# Patient Record
Sex: FEMALE | Race: WHITE | Employment: UNEMPLOYED | URBAN - NONMETROPOLITAN AREA
[De-identification: names, ages, dates, MRNs, and addresses within clinical notes are randomized per-mention and may not be internally consistent; named-entity substitution may affect disease eponyms.]

---

## 2017-01-18 ENCOUNTER — NURSE TRIAGE (OUTPATIENT)
Dept: ADMINISTRATIVE | Age: 22
End: 2017-01-18

## 2017-07-21 ENCOUNTER — HOSPITAL ENCOUNTER (OUTPATIENT)
Dept: MRI IMAGING | Age: 22
Discharge: HOME | End: 2017-07-21
Payer: COMMERCIAL

## 2017-07-21 ENCOUNTER — HOSPITAL ENCOUNTER (OUTPATIENT)
Dept: NUCLEAR MEDICINE | Age: 22
Discharge: HOME | End: 2017-07-21
Payer: COMMERCIAL

## 2017-07-21 DIAGNOSIS — M54.5 LOW BACK PAIN, UNSPECIFIED BACK PAIN LATERALITY, UNSPECIFIED CHRONICITY, WITH SCIATICA PRESENCE UNSPECIFIED: ICD-10-CM

## 2017-07-21 PROCEDURE — A9503 TC99M MEDRONATE: HCPCS | Performed by: ORTHOPAEDIC SURGERY

## 2017-07-21 PROCEDURE — 72148 MRI LUMBAR SPINE W/O DYE: CPT

## 2017-07-21 PROCEDURE — 78320 NM BONE SCAN SPECT: CPT

## 2017-07-21 PROCEDURE — 3430000000 HC RX DIAGNOSTIC RADIOPHARMACEUTICAL: Performed by: ORTHOPAEDIC SURGERY

## 2017-07-21 RX ORDER — TC 99M MEDRONATE 20 MG/10ML
22.7 INJECTION, POWDER, LYOPHILIZED, FOR SOLUTION INTRAVENOUS
Status: COMPLETED | OUTPATIENT
Start: 2017-07-21 | End: 2017-07-21

## 2017-07-21 RX ADMIN — Medication 22.7 MILLICURIE: at 12:05

## 2017-07-24 ENCOUNTER — OFFICE VISIT (OUTPATIENT)
Dept: UROLOGY | Age: 22
End: 2017-07-24
Payer: COMMERCIAL

## 2017-07-24 VITALS
BODY MASS INDEX: 26.47 KG/M2 | HEIGHT: 63 IN | DIASTOLIC BLOOD PRESSURE: 78 MMHG | WEIGHT: 149.4 LBS | SYSTOLIC BLOOD PRESSURE: 118 MMHG

## 2017-07-24 DIAGNOSIS — N39.0 RECURRENT UTI: Primary | ICD-10-CM

## 2017-07-24 LAB
BILIRUBIN, POC: NORMAL
BLOOD URINE, POC: NORMAL
CLARITY, POC: CLEAR
COLOR, POC: YELLOW
GLUCOSE URINE, POC: NORMAL
KETONES, POC: NORMAL
LEUKOCYTE EST, POC: NORMAL
NITRITE, POC: NORMAL
PH, POC: 5.5
POST VOID RESIDUAL (PVR): 84 ML
PROTEIN, POC: NORMAL
SPECIFIC GRAVITY, POC: 1.02
UROBILINOGEN, POC: NORMAL

## 2017-07-24 PROCEDURE — 81003 URINALYSIS AUTO W/O SCOPE: CPT | Performed by: UROLOGY

## 2017-07-24 PROCEDURE — 99203 OFFICE O/P NEW LOW 30 MIN: CPT | Performed by: UROLOGY

## 2017-07-24 PROCEDURE — 51798 US URINE CAPACITY MEASURE: CPT | Performed by: UROLOGY

## 2017-07-24 RX ORDER — CEPHALEXIN 500 MG/1
500 CAPSULE ORAL 4 TIMES DAILY
Qty: 20 CAPSULE | Refills: 0 | Status: ON HOLD | OUTPATIENT
Start: 2017-07-24 | End: 2018-11-05

## 2017-07-24 ASSESSMENT — ENCOUNTER SYMPTOMS
EYE PAIN: 0
CHEST TIGHTNESS: 0
SHORTNESS OF BREATH: 0
FACIAL SWELLING: 0
COLOR CHANGE: 0
BACK PAIN: 1
ABDOMINAL PAIN: 0
EYE REDNESS: 0
NAUSEA: 0

## 2017-07-27 ENCOUNTER — TELEPHONE (OUTPATIENT)
Dept: UROLOGY | Age: 22
End: 2017-07-27

## 2017-08-09 ENCOUNTER — TELEPHONE (OUTPATIENT)
Dept: UROLOGY | Age: 22
End: 2017-08-09

## 2017-09-26 ENCOUNTER — TELEPHONE (OUTPATIENT)
Dept: UROLOGY | Age: 22
End: 2017-09-26

## 2017-09-26 DIAGNOSIS — N39.0 RECURRENT UTI: Primary | ICD-10-CM

## 2017-10-01 LAB
BILIRUBIN URINE: ABNORMAL MG/DL
BLOOD, URINE: ABNORMAL
CLARITY: ABNORMAL
COLOR: YELLOW
GLUCOSE URINE: ABNORMAL
KETONES, URINE: NEGATIVE
LEUKOCYTE ESTERASE, URINE: ABNORMAL
NITRITE, URINE: NEGATIVE
PH UA: 6.5 (ref 4.5–8)
PROTEIN UA: ABNORMAL
SPECIFIC GRAVITY UA: 1.02 (ref 1–1.03)
UROBILINOGEN, URINE: NORMAL

## 2017-10-06 ENCOUNTER — TELEPHONE (OUTPATIENT)
Dept: UROLOGY | Age: 22
End: 2017-10-06

## 2018-10-29 ENCOUNTER — HOSPITAL ENCOUNTER (OUTPATIENT)
Dept: NON INVASIVE DIAGNOSTICS | Age: 23
Discharge: HOME | End: 2018-10-29
Payer: COMMERCIAL

## 2018-10-29 PROCEDURE — 93225 XTRNL ECG REC<48 HRS REC: CPT

## 2018-10-29 PROCEDURE — 93226 XTRNL ECG REC<48 HR SCAN A/R: CPT

## 2018-10-29 NOTE — PROCEDURES
The skin was prepped and a holter monitor was applied. The patient was instructed on the documentation of symptoms and the purpose of the holter as well as the things to avoid while wearing the holter. The patient was instructed to remove and return the holter on Thursday, November 1. The serial number of the holter that was applied is [id number].

## 2018-11-05 ENCOUNTER — ANESTHESIA (OUTPATIENT)
Dept: LABOR AND DELIVERY | Age: 23
DRG: 560 | End: 2018-11-05
Payer: COMMERCIAL

## 2018-11-05 ENCOUNTER — ANESTHESIA EVENT (OUTPATIENT)
Dept: LABOR AND DELIVERY | Age: 23
DRG: 560 | End: 2018-11-05
Payer: COMMERCIAL

## 2018-11-05 ENCOUNTER — HOSPITAL ENCOUNTER (INPATIENT)
Age: 23
LOS: 2 days | Discharge: HOME | DRG: 560 | End: 2018-11-07
Attending: OBSTETRICS & GYNECOLOGY | Admitting: OBSTETRICS & GYNECOLOGY
Payer: COMMERCIAL

## 2018-11-05 LAB
ABO: NORMAL
AMPHETAMINE+METHAMPHETAMINE URINE SCREEN: NEGATIVE
ANTIBODY SCREEN: NORMAL
BARBITURATE QUANTITATIVE URINE: NEGATIVE
BENZODIAZEPINE QUANTITATIVE URINE: NEGATIVE
CANNABINOID QUANTITATIVE URINE: NEGATIVE
COCAINE METABOLITE QUANTITATIVE URINE: NEGATIVE
ERYTHROCYTE [DISTWIDTH] IN BLOOD BY AUTOMATED COUNT: 13.2 % (ref 11.5–14.5)
ERYTHROCYTE [DISTWIDTH] IN BLOOD BY AUTOMATED COUNT: 43.1 FL (ref 35–45)
GLUCOSE BLD-MCNC: 63 MG/DL (ref 70–108)
GLUCOSE BLD-MCNC: 72 MG/DL (ref 70–108)
GLUCOSE BLD-MCNC: 75 MG/DL (ref 70–108)
GLUCOSE BLD-MCNC: 83 MG/DL (ref 70–108)
GLUCOSE BLD-MCNC: 88 MG/DL (ref 70–108)
GLUCOSE BLD-MCNC: 99 MG/DL (ref 70–108)
HCT VFR BLD CALC: 41.4 % (ref 37–47)
HEMOGLOBIN: 14.1 GM/DL (ref 12–16)
MCH RBC QN AUTO: 30.3 PG (ref 26–33)
MCHC RBC AUTO-ENTMCNC: 34.1 GM/DL (ref 32.2–35.5)
MCV RBC AUTO: 89 FL (ref 81–99)
OPIATES, URINE: NEGATIVE
OXYCODONE: NEGATIVE
PHENCYCLIDINE QUANTITATIVE URINE: NEGATIVE
PLATELET # BLD: 245 THOU/MM3 (ref 130–400)
PMV BLD AUTO: 10.3 FL (ref 9.4–12.4)
RBC # BLD: 4.65 MILL/MM3 (ref 4.2–5.4)
RH FACTOR: NORMAL
WBC # BLD: 11.9 THOU/MM3 (ref 4.8–10.8)

## 2018-11-05 PROCEDURE — 4A1H7CZ MONITORING OF PRODUCTS OF CONCEPTION, CARDIAC RATE, VIA NATURAL OR ARTIFICIAL OPENING: ICD-10-PCS | Performed by: OBSTETRICS & GYNECOLOGY

## 2018-11-05 PROCEDURE — 86900 BLOOD TYPING SEROLOGIC ABO: CPT

## 2018-11-05 PROCEDURE — 6360000002 HC RX W HCPCS: Performed by: NURSE ANESTHETIST, CERTIFIED REGISTERED

## 2018-11-05 PROCEDURE — 7200000001 HC VAGINAL DELIVERY

## 2018-11-05 PROCEDURE — 0HQ9XZZ REPAIR PERINEUM SKIN, EXTERNAL APPROACH: ICD-10-PCS | Performed by: OBSTETRICS & GYNECOLOGY

## 2018-11-05 PROCEDURE — 10H07YZ INSERTION OF OTHER DEVICE INTO PRODUCTS OF CONCEPTION, VIA NATURAL OR ARTIFICIAL OPENING: ICD-10-PCS | Performed by: OBSTETRICS & GYNECOLOGY

## 2018-11-05 PROCEDURE — 86901 BLOOD TYPING SEROLOGIC RH(D): CPT

## 2018-11-05 PROCEDURE — 85027 COMPLETE CBC AUTOMATED: CPT

## 2018-11-05 PROCEDURE — 10907ZC DRAINAGE OF AMNIOTIC FLUID, THERAPEUTIC FROM PRODUCTS OF CONCEPTION, VIA NATURAL OR ARTIFICIAL OPENING: ICD-10-PCS | Performed by: OBSTETRICS & GYNECOLOGY

## 2018-11-05 PROCEDURE — 1220000001 HC SEMI PRIVATE L&D R&B

## 2018-11-05 PROCEDURE — 36415 COLL VENOUS BLD VENIPUNCTURE: CPT

## 2018-11-05 PROCEDURE — 80307 DRUG TEST PRSMV CHEM ANLYZR: CPT

## 2018-11-05 PROCEDURE — 2580000003 HC RX 258: Performed by: OBSTETRICS & GYNECOLOGY

## 2018-11-05 PROCEDURE — 10H073Z INSERTION OF MONITORING ELECTRODE INTO PRODUCTS OF CONCEPTION, VIA NATURAL OR ARTIFICIAL OPENING: ICD-10-PCS | Performed by: OBSTETRICS & GYNECOLOGY

## 2018-11-05 PROCEDURE — 86850 RBC ANTIBODY SCREEN: CPT

## 2018-11-05 PROCEDURE — 3700000025 ANESTHESIA EPIDURAL BLOCK: Performed by: ANESTHESIOLOGY

## 2018-11-05 PROCEDURE — 82948 REAGENT STRIP/BLOOD GLUCOSE: CPT

## 2018-11-05 PROCEDURE — 3E033VJ INTRODUCTION OF OTHER HORMONE INTO PERIPHERAL VEIN, PERCUTANEOUS APPROACH: ICD-10-PCS | Performed by: OBSTETRICS & GYNECOLOGY

## 2018-11-05 PROCEDURE — 2709999900 HC NON-CHARGEABLE SUPPLY

## 2018-11-05 PROCEDURE — 2500000003 HC RX 250 WO HCPCS: Performed by: ANESTHESIOLOGY

## 2018-11-05 PROCEDURE — 6360000002 HC RX W HCPCS: Performed by: OBSTETRICS & GYNECOLOGY

## 2018-11-05 PROCEDURE — 4A1HXCZ MONITORING OF PRODUCTS OF CONCEPTION, CARDIAC RATE, EXTERNAL APPROACH: ICD-10-PCS | Performed by: OBSTETRICS & GYNECOLOGY

## 2018-11-05 PROCEDURE — 6360000002 HC RX W HCPCS

## 2018-11-05 PROCEDURE — 86592 SYPHILIS TEST NON-TREP QUAL: CPT

## 2018-11-05 RX ORDER — METHYLERGONOVINE MALEATE 0.2 MG/ML
200 INJECTION INTRAVENOUS PRN
Status: DISCONTINUED | OUTPATIENT
Start: 2018-11-05 | End: 2018-11-06 | Stop reason: HOSPADM

## 2018-11-05 RX ORDER — SODIUM CHLORIDE, SODIUM LACTATE, POTASSIUM CHLORIDE, CALCIUM CHLORIDE 600; 310; 30; 20 MG/100ML; MG/100ML; MG/100ML; MG/100ML
INJECTION, SOLUTION INTRAVENOUS CONTINUOUS
Status: DISCONTINUED | OUTPATIENT
Start: 2018-11-05 | End: 2018-11-06

## 2018-11-05 RX ORDER — DIPHENHYDRAMINE HYDROCHLORIDE 50 MG/ML
25 INJECTION INTRAMUSCULAR; INTRAVENOUS EVERY 4 HOURS PRN
Status: DISCONTINUED | OUTPATIENT
Start: 2018-11-05 | End: 2018-11-06 | Stop reason: HOSPADM

## 2018-11-05 RX ORDER — ONDANSETRON 2 MG/ML
8 INJECTION INTRAMUSCULAR; INTRAVENOUS EVERY 6 HOURS PRN
Status: DISCONTINUED | OUTPATIENT
Start: 2018-11-05 | End: 2018-11-06 | Stop reason: HOSPADM

## 2018-11-05 RX ORDER — ONDANSETRON 2 MG/ML
4 INJECTION INTRAMUSCULAR; INTRAVENOUS EVERY 6 HOURS PRN
Status: DISCONTINUED | OUTPATIENT
Start: 2018-11-05 | End: 2018-11-06 | Stop reason: HOSPADM

## 2018-11-05 RX ORDER — MISOPROSTOL 200 UG/1
1000 TABLET ORAL PRN
Status: DISCONTINUED | OUTPATIENT
Start: 2018-11-05 | End: 2018-11-06 | Stop reason: HOSPADM

## 2018-11-05 RX ORDER — NALBUPHINE HCL 10 MG/ML
5 AMPUL (ML) INJECTION EVERY 4 HOURS PRN
Status: DISCONTINUED | OUTPATIENT
Start: 2018-11-05 | End: 2018-11-06 | Stop reason: HOSPADM

## 2018-11-05 RX ORDER — ROPIVACAINE HYDROCHLORIDE 2 MG/ML
INJECTION, SOLUTION EPIDURAL; INFILTRATION; PERINEURAL
Status: COMPLETED
Start: 2018-11-05 | End: 2018-11-05

## 2018-11-05 RX ORDER — PENICILLIN G 2000000 [IU]/50ML
2 INJECTION, SOLUTION INTRAVENOUS ONCE
Status: COMPLETED | OUTPATIENT
Start: 2018-11-05 | End: 2018-11-05

## 2018-11-05 RX ORDER — ACETAMINOPHEN 325 MG/1
650 TABLET ORAL EVERY 4 HOURS PRN
Status: DISCONTINUED | OUTPATIENT
Start: 2018-11-05 | End: 2018-11-06 | Stop reason: HOSPADM

## 2018-11-05 RX ORDER — NALOXONE HYDROCHLORIDE 0.4 MG/ML
0.4 INJECTION, SOLUTION INTRAMUSCULAR; INTRAVENOUS; SUBCUTANEOUS PRN
Status: DISCONTINUED | OUTPATIENT
Start: 2018-11-05 | End: 2018-11-06 | Stop reason: HOSPADM

## 2018-11-05 RX ORDER — ROPIVACAINE HYDROCHLORIDE 2 MG/ML
INJECTION, SOLUTION EPIDURAL; INFILTRATION; PERINEURAL PRN
Status: DISCONTINUED | OUTPATIENT
Start: 2018-11-05 | End: 2018-11-05 | Stop reason: SDUPTHER

## 2018-11-05 RX ORDER — TERBUTALINE SULFATE 1 MG/ML
0.25 INJECTION, SOLUTION SUBCUTANEOUS ONCE
Status: DISCONTINUED | OUTPATIENT
Start: 2018-11-05 | End: 2018-11-06 | Stop reason: HOSPADM

## 2018-11-05 RX ORDER — BUTORPHANOL TARTRATE 1 MG/ML
1 INJECTION, SOLUTION INTRAMUSCULAR; INTRAVENOUS
Status: DISCONTINUED | OUTPATIENT
Start: 2018-11-05 | End: 2018-11-06 | Stop reason: HOSPADM

## 2018-11-05 RX ORDER — IBUPROFEN 800 MG/1
800 TABLET ORAL EVERY 8 HOURS PRN
Status: CANCELLED | OUTPATIENT
Start: 2018-11-05

## 2018-11-05 RX ORDER — LIDOCAINE HYDROCHLORIDE 10 MG/ML
30 INJECTION, SOLUTION EPIDURAL; INFILTRATION; INTRACAUDAL; PERINEURAL PRN
Status: DISCONTINUED | OUTPATIENT
Start: 2018-11-05 | End: 2018-11-06 | Stop reason: HOSPADM

## 2018-11-05 RX ORDER — CARBOPROST TROMETHAMINE 250 UG/ML
250 INJECTION, SOLUTION INTRAMUSCULAR PRN
Status: DISCONTINUED | OUTPATIENT
Start: 2018-11-05 | End: 2018-11-06 | Stop reason: HOSPADM

## 2018-11-05 RX ORDER — PENICILLIN G 3000000 [IU]/50ML
3 INJECTION, SOLUTION INTRAVENOUS EVERY 4 HOURS
Status: DISCONTINUED | OUTPATIENT
Start: 2018-11-05 | End: 2018-11-06 | Stop reason: HOSPADM

## 2018-11-05 RX ADMIN — ROPIVACAINE HYDROCHLORIDE 10 ML: 2 INJECTION, SOLUTION EPIDURAL; INFILTRATION at 16:17

## 2018-11-05 RX ADMIN — BUTORPHANOL TARTRATE 1 MG: 1 INJECTION, SOLUTION INTRAMUSCULAR; INTRAVENOUS at 14:10

## 2018-11-05 RX ADMIN — PENICILLIN G 2 MILLION UNITS: 2000000 INJECTION, SOLUTION INTRAVENOUS at 12:16

## 2018-11-05 RX ADMIN — SODIUM CHLORIDE, POTASSIUM CHLORIDE, SODIUM LACTATE AND CALCIUM CHLORIDE: 600; 310; 30; 20 INJECTION, SOLUTION INTRAVENOUS at 11:25

## 2018-11-05 RX ADMIN — PENICILLIN G 3 MILLION UNITS: 3000000 INJECTION, SOLUTION INTRAVENOUS at 12:59

## 2018-11-05 RX ADMIN — PENICILLIN G 3 MILLION UNITS: 3000000 INJECTION, SOLUTION INTRAVENOUS at 20:15

## 2018-11-05 RX ADMIN — SODIUM CHLORIDE, POTASSIUM CHLORIDE, SODIUM LACTATE AND CALCIUM CHLORIDE: 600; 310; 30; 20 INJECTION, SOLUTION INTRAVENOUS at 17:00

## 2018-11-05 RX ADMIN — Medication 16 ML/HR: at 16:17

## 2018-11-05 RX ADMIN — ONDANSETRON HYDROCHLORIDE 8 MG: 2 SOLUTION INTRAMUSCULAR; INTRAVENOUS at 18:40

## 2018-11-05 RX ADMIN — PENICILLIN G 3 MILLION UNITS: 3000000 INJECTION, SOLUTION INTRAVENOUS at 16:14

## 2018-11-05 RX ADMIN — SODIUM CHLORIDE, POTASSIUM CHLORIDE, SODIUM LACTATE AND CALCIUM CHLORIDE: 600; 310; 30; 20 INJECTION, SOLUTION INTRAVENOUS at 14:55

## 2018-11-05 RX ADMIN — Medication 1 MILLI-UNITS/MIN: at 12:25

## 2018-11-05 ASSESSMENT — PAIN SCALES - GENERAL: PAINLEVEL_OUTOF10: 5

## 2018-11-05 NOTE — ANESTHESIA PRE PROCEDURE
 carboprost (HEMABATE) injection 250 mcg  250 mcg Intramuscular PRN Jr Singleton MD        misoprostol (CYTOTEC) tablet 1,000 mcg  1,000 mcg Rectal PRN Jr Singleton MD        witch hazel-glycerin (TUCKS) pad   Topical PRN Jr Singleton MD        benzocaine-menthol (DERMOPLAST) 20-0.5 % spray   Topical PRN Jr Singleton MD        penicillin G potassium IVPB 3 Million Units  3 Million Units Intravenous Q4H Jr Singleton  mL/hr at 11/05/18 1614 3 Million Units at 11/05/18 1614    naloxone (NARCAN) injection 0.4 mg  0.4 mg Intravenous PRN Mickey Patel MD        nalbuphine (NUBAIN) injection 5 mg  5 mg Intravenous Q4H PRN Mickey Patel MD        ondansetron [facility]) injection 4 mg  4 mg Intravenous Q6H PRN Mickey Patel MD        fentaNYL 750 mcg, ropivacaine 0.1% in sodium chloride 0.9% 265mL (OB) epidural  16 mL/hr Epidural Continuous Mickey Patel MD 16 mL/hr at 11/05/18 1617 16 mL/hr at 11/05/18 1617     Facility-Administered Medications Ordered in Other Encounters   Medication Dose Route Frequency Provider Last Rate Last Dose    ropivacaine (NAROPIN) 0.2% injection 0.2%    PRN Ana Rhina, APRN - CRNA   10 mL at 11/05/18 1617       Allergies:  No Known Allergies    Problem List:    Patient Active Problem List   Diagnosis Code    Insulin controlled gestational diabetes mellitus in third trimester O24.414    Chronic tonsil/adenoid disease J35.9       Past Medical History:        Diagnosis Date    Gestational diabetes     started on insulin at 16-20 weeks       Past Surgical History:        Procedure Laterality Date    TONSILLECTOMY  2/29/2016       Social History:    Social History   Substance Use Topics    Smoking status: Never Smoker    Smokeless tobacco: Never Used    Alcohol use No                                Counseling given: Not Answered      Vital Signs (Current):   Vitals:    11/05/18 1340 11/05/18 1400 11/05/18 1500 11/05/18 1530   BP:  132/69 111/71 125/68

## 2018-11-05 NOTE — PLAN OF CARE
Problem: Anxiety:  Goal: Level of anxiety will decrease  Level of anxiety will decrease  Outcome: Ongoing  Pt is anxious due to being in early labor, pt is reassured with RN and MD education    Problem: Breathing Pattern - Ineffective:  Goal: Able to breathe comfortably  Able to breathe comfortably  Outcome: Ongoing  RN notes, easy and even respers on assessment. Pt denies being SOB    Problem: Fluid Volume - Imbalance:  Goal: Absence of intrapartum hemorrhage signs and symptoms  Absence of intrapartum hemorrhage signs and symptoms  Outcome: Ongoing  No vaginal bleeding noted at this time. Problem: Labor Process - Prolonged:  Goal: Uterine contractions within specified parameters  Uterine contractions within specified parameters  Outcome: Ongoing  Contractions noted, MD augmented with Pitocin to get them closer and stronger    Problem: Pain - Acute:  Goal: Able to cope with pain  Able to cope with pain  Outcome: Ongoing  Pain /10, pain goal 7/10. Plans a labor epidural    Problem: Tissue Perfusion - Uteroplacental, Altered:  Goal: Absence of abnormal fetal heart rate pattern  Absence of abnormal fetal heart rate pattern  Outcome: Ongoing  Reactive FHT's obtained. Problem: Falls - Risk of:  Goal: Absence of physical injury  Absence of physical injury  Outcome: Ongoing  Call light within reach, pt resting in bed    Problem: Discharge Planning:  Goal: Discharged to appropriate level of care  Discharged to appropriate level of care  Outcome: Ongoing  Pt and  to remain in L&D for recovery period and then will be transferred to [facility] for further care    Comments: Care plan reviewed with patient and FOB. Patient and FOB verbalize understanding of the plan of care and contribute to goal setting.

## 2018-11-06 LAB
GLUCOSE BLD-MCNC: 175 MG/DL (ref 70–108)
HEMOGLOBIN: 12.3 GM/DL (ref 12–16)
RPR: NONREACTIVE

## 2018-11-06 PROCEDURE — 82948 REAGENT STRIP/BLOOD GLUCOSE: CPT

## 2018-11-06 PROCEDURE — 36415 COLL VENOUS BLD VENIPUNCTURE: CPT

## 2018-11-06 PROCEDURE — 2709999900 HC NON-CHARGEABLE SUPPLY

## 2018-11-06 PROCEDURE — 6370000000 HC RX 637 (ALT 250 FOR IP): Performed by: OBSTETRICS & GYNECOLOGY

## 2018-11-06 PROCEDURE — 1220000000 HC SEMI PRIVATE OB R&B

## 2018-11-06 PROCEDURE — 85018 HEMOGLOBIN: CPT

## 2018-11-06 RX ORDER — LANOLIN 100 %
OINTMENT (GRAM) TOPICAL PRN
Status: DISCONTINUED | OUTPATIENT
Start: 2018-11-06 | End: 2018-11-07 | Stop reason: HOSPADM

## 2018-11-06 RX ORDER — HYDROCODONE BITARTRATE AND ACETAMINOPHEN 5; 325 MG/1; MG/1
2 TABLET ORAL EVERY 4 HOURS PRN
Status: DISCONTINUED | OUTPATIENT
Start: 2018-11-06 | End: 2018-11-07 | Stop reason: HOSPADM

## 2018-11-06 RX ORDER — FERROUS SULFATE 325(65) MG
325 TABLET ORAL
Status: DISCONTINUED | OUTPATIENT
Start: 2018-11-06 | End: 2018-11-07 | Stop reason: HOSPADM

## 2018-11-06 RX ORDER — SODIUM CHLORIDE 0.9 % (FLUSH) 0.9 %
10 SYRINGE (ML) INJECTION PRN
Status: DISCONTINUED | OUTPATIENT
Start: 2018-11-06 | End: 2018-11-07 | Stop reason: HOSPADM

## 2018-11-06 RX ORDER — ONDANSETRON 4 MG/1
8 TABLET, FILM COATED ORAL EVERY 8 HOURS PRN
Status: DISCONTINUED | OUTPATIENT
Start: 2018-11-06 | End: 2018-11-07 | Stop reason: HOSPADM

## 2018-11-06 RX ORDER — SODIUM CHLORIDE, SODIUM LACTATE, POTASSIUM CHLORIDE, CALCIUM CHLORIDE 600; 310; 30; 20 MG/100ML; MG/100ML; MG/100ML; MG/100ML
INJECTION, SOLUTION INTRAVENOUS CONTINUOUS
Status: DISCONTINUED | OUTPATIENT
Start: 2018-11-06 | End: 2018-11-07 | Stop reason: HOSPADM

## 2018-11-06 RX ORDER — SODIUM CHLORIDE 0.9 % (FLUSH) 0.9 %
10 SYRINGE (ML) INJECTION EVERY 12 HOURS SCHEDULED
Status: DISCONTINUED | OUTPATIENT
Start: 2018-11-06 | End: 2018-11-06

## 2018-11-06 RX ORDER — HYDROCODONE BITARTRATE AND ACETAMINOPHEN 5; 325 MG/1; MG/1
1 TABLET ORAL EVERY 4 HOURS PRN
Status: DISCONTINUED | OUTPATIENT
Start: 2018-11-06 | End: 2018-11-07 | Stop reason: HOSPADM

## 2018-11-06 RX ORDER — MISOPROSTOL 200 UG/1
1000 TABLET ORAL PRN
Status: DISCONTINUED | OUTPATIENT
Start: 2018-11-06 | End: 2018-11-07 | Stop reason: HOSPADM

## 2018-11-06 RX ORDER — METHYLERGONOVINE MALEATE 0.2 MG/ML
200 INJECTION INTRAVENOUS SEE ADMIN INSTRUCTIONS
Status: DISCONTINUED | OUTPATIENT
Start: 2018-11-06 | End: 2018-11-07 | Stop reason: HOSPADM

## 2018-11-06 RX ORDER — ACETAMINOPHEN 325 MG/1
650 TABLET ORAL EVERY 4 HOURS PRN
Status: DISCONTINUED | OUTPATIENT
Start: 2018-11-06 | End: 2018-11-07 | Stop reason: HOSPADM

## 2018-11-06 RX ORDER — CARBOPROST TROMETHAMINE 250 UG/ML
250 INJECTION, SOLUTION INTRAMUSCULAR
Status: ACTIVE | OUTPATIENT
Start: 2018-11-06 | End: 2018-11-06

## 2018-11-06 RX ORDER — DOCUSATE SODIUM 100 MG/1
100 CAPSULE, LIQUID FILLED ORAL 2 TIMES DAILY PRN
Status: DISCONTINUED | OUTPATIENT
Start: 2018-11-06 | End: 2018-11-07 | Stop reason: HOSPADM

## 2018-11-06 RX ORDER — IBUPROFEN 800 MG/1
800 TABLET ORAL EVERY 8 HOURS
Status: DISCONTINUED | OUTPATIENT
Start: 2018-11-06 | End: 2018-11-07 | Stop reason: HOSPADM

## 2018-11-06 RX ADMIN — DOCUSATE SODIUM 100 MG: 100 CAPSULE, LIQUID FILLED ORAL at 12:55

## 2018-11-06 RX ADMIN — IBUPROFEN 800 MG: 800 TABLET, FILM COATED ORAL at 14:25

## 2018-11-06 RX ADMIN — DOCUSATE SODIUM 100 MG: 100 CAPSULE, LIQUID FILLED ORAL at 20:45

## 2018-11-06 RX ADMIN — IBUPROFEN 800 MG: 800 TABLET, FILM COATED ORAL at 06:18

## 2018-11-06 RX ADMIN — IBUPROFEN 800 MG: 800 TABLET, FILM COATED ORAL at 23:39

## 2018-11-06 ASSESSMENT — PAIN SCALES - GENERAL
PAINLEVEL_OUTOF10: 5
PAINLEVEL_OUTOF10: 5
PAINLEVEL_OUTOF10: 2
PAINLEVEL_OUTOF10: 2
PAINLEVEL_OUTOF10: 6

## 2018-11-06 NOTE — L&D DELIVERY NOTE
Department of Obstetrics and Gynecology  Spontaneous Vaginal Delivery Note      Pt Name: Mary Aguillon  MRN: [id number] [place] #: [de-identified]  YOB: 1995  Procedure Performed By: Dontrell Smith D.O.        Pre-operative Diagnosis:  Term pregnancy, Single fetus and Pregnancy complicated by: [email or web address]    Post-operative Diagnosis: Same, delivered. Procedure:  Spontaneous vaginal delivery or Repair first degree spontaneous laceration    Surgeon:  Shahram Fulton DO, D.O. Information for the patient's :  Manuel Kelly [[id number]]          Anesthesia:  epidural anesthesia    Estimated blood loss:  300ml    Specimen:  Placenta not sent to pathology     Complications:  none    Condition:  infant stable to general nursery and mother stable    Details of Procedure: The patient is a 26 y.o. female at 38w3d   OB History      Para Term  AB Living    2 1 0 1 0 1    SAB TAB Ectopic Molar Multiple Live Births    0 0 0   0 1       who was admitted for early latent labor. She received the following interventions: ARBOW and IV Pitocin augmentation. The patient progressed well,did receive an epidural, became complete and started to push. After pushing for a 1.5hr, suddenly went from 0 station to 3+ in 1 pushing so I was called for delivery as I was on the unit. Dr Deepak Sánchez was notified by staff. Patient progressed well and the fetal head was delivered over an intact perineum, no nuchal cord was noted, and the rest of the infant delivered atraumatically. Infant was placed on mother abdomen. Nose and mouth suctioned with bulb suction. Cord was clamped and cut. The delivery of the placenta was spontaneous and noted intact. The perineum and vagina were explored and a first degree perineal laceration was repaired in standard fashion with 3-0 vicryl. Sponge, instruments, and needle counts were correct. Needles were disposed of appropriately.       Delivery Summary:

## 2018-11-07 VITALS
HEART RATE: 69 BPM | HEIGHT: 61 IN | BODY MASS INDEX: 31.91 KG/M2 | TEMPERATURE: 97.5 F | RESPIRATION RATE: 18 BRPM | DIASTOLIC BLOOD PRESSURE: 69 MMHG | WEIGHT: 169 LBS | SYSTOLIC BLOOD PRESSURE: 118 MMHG | OXYGEN SATURATION: 99 %

## 2018-11-07 PROCEDURE — 2709999900 HC NON-CHARGEABLE SUPPLY

## 2018-11-07 PROCEDURE — 6370000000 HC RX 637 (ALT 250 FOR IP): Performed by: OBSTETRICS & GYNECOLOGY

## 2018-11-07 RX ADMIN — DOCUSATE SODIUM 100 MG: 100 CAPSULE, LIQUID FILLED ORAL at 07:57

## 2018-11-07 RX ADMIN — IBUPROFEN 800 MG: 800 TABLET, FILM COATED ORAL at 11:15

## 2018-11-07 ASSESSMENT — PAIN SCALES - GENERAL: PAINLEVEL_OUTOF10: 5

## 2019-01-05 ENCOUNTER — NURSE TRIAGE (OUTPATIENT)
Dept: ADMINISTRATIVE | Age: 24
End: 2019-01-05

## 2019-05-09 ENCOUNTER — INITIAL CONSULT (OUTPATIENT)
Dept: NEUROLOGY | Age: 24
End: 2019-05-09
Payer: COMMERCIAL

## 2019-05-09 VITALS
BODY MASS INDEX: 27.56 KG/M2 | SYSTOLIC BLOOD PRESSURE: 126 MMHG | HEIGHT: 61 IN | WEIGHT: 146 LBS | HEART RATE: 80 BPM | DIASTOLIC BLOOD PRESSURE: 68 MMHG

## 2019-05-09 DIAGNOSIS — G43.119 INTRACTABLE MIGRAINE WITH AURA WITHOUT STATUS MIGRAINOSUS: Primary | ICD-10-CM

## 2019-05-09 PROCEDURE — G8427 DOCREV CUR MEDS BY ELIG CLIN: HCPCS | Performed by: PSYCHIATRY & NEUROLOGY

## 2019-05-09 PROCEDURE — G8419 CALC BMI OUT NRM PARAM NOF/U: HCPCS | Performed by: PSYCHIATRY & NEUROLOGY

## 2019-05-09 PROCEDURE — 99244 OFF/OP CNSLTJ NEW/EST MOD 40: CPT | Performed by: PSYCHIATRY & NEUROLOGY

## 2019-05-09 RX ORDER — FOLIC ACID 1 MG/1
1 TABLET ORAL DAILY
Qty: 90 TABLET | Refills: 1 | Status: SHIPPED | OUTPATIENT
Start: 2019-05-09

## 2019-05-09 RX ORDER — VENLAFAXINE HYDROCHLORIDE 37.5 MG/1
37.5 CAPSULE, EXTENDED RELEASE ORAL DAILY
Qty: 30 CAPSULE | Refills: 1 | Status: SHIPPED | OUTPATIENT
Start: 2019-05-09

## 2019-05-09 RX ORDER — IBUPROFEN 800 MG/1
800 TABLET ORAL EVERY 6 HOURS PRN
COMMUNITY

## 2019-05-09 NOTE — LETTER
[facility]  [address] [facility] [address]  Dept: [phone]  Dept Fax: [phone]  Loc: [address], APRN - CNP        5/9/2019      Patient:  Brian Eagle  MRN:  [id number]  YOB: 1995  Date of Visit:  5/9/2019    Dear Dr. Aiyana Llanos,    Thank you for referring Ileana Green to me for consultation. Please see attached visit summary with my findings. If you have any questions, please do not hesitate to call me.       Sincerely,         JOO Nettles - CNP

## 2019-05-09 NOTE — PATIENT INSTRUCTIONS
1. Start Effexor 37.5 mg daily   2. Start folic acid 1 mg daily  3. EEG  4. Vitamin B12, folate  5. Reduce the amount of NSAIDs you are taking as these can cause rebound headache  6. Call with any new symptoms or concerns. 7. Follow up in 6 weeks.

## 2019-05-17 ENCOUNTER — HOSPITAL ENCOUNTER (OUTPATIENT)
Dept: NEUROLOGY | Age: 24
Discharge: HOME | End: 2019-05-17
Payer: COMMERCIAL

## 2019-05-17 PROCEDURE — 95819 EEG AWAKE AND ASLEEP: CPT

## 2019-05-17 NOTE — PROGRESS NOTES
[facility] Laboratory Technician worksheet       EEG Date: 2019    Name: Guillermo Escobedo   : 1995   Age: 26 y.o. SEX: female    Room: OP    MRN: [id number]     CSN: [id number]    Ordering Provider: Jonel Mueller  EEG Number: [id number] Time of Test:  10:48    Hand: Right   Sedation: No    H.V. Done: Yes with good effort Photic: Yes    Sleep: No   Drowsy: Yes   Sleep Deprived: No    Seizures observed: no    Technician impression:1    Mentality: alert       Clinical History: Pt states that she is getting headaches almost everyday for the past 3 months. She said that she sees light flashes and black dots in her peripheral vision when this happens. She had a MRI on 3/29/19 . Past Medical History:       Diagnosis Date    Gestational diabetes     started on insulin at 16-20 weeks         Prior to Admission medications    Medication Sig Start Date End Date Taking?  Authorizing Provider   ibuprofen (ADVIL;MOTRIN) 800 MG tablet Take 800 mg by mouth every 6 hours as needed for Pain    Historical Provider, MD   norethindrone-ethinyl estradiol (Rise Chilo) 0.4-35 MG-MCG per tablet Take by mouth    Historical Provider, MD   venlafaxine (EFFEXOR XR) 37.5 MG extended release capsule Take 1 capsule by mouth daily 19   JOO De Santiago CNP   folic acid (FOLVITE) 1 MG tablet Take 1 tablet by mouth daily 19   JOO De Santiago CNP       Technician: Nigel Shine 2019

## 2019-05-20 NOTE — PROGRESS NOTES
Chief Complaint   Patient presents with    Consultation   ? ? Rachid    ? Marisol Szymanski is a 26 y.o. female who presents today for evaluation of headache since teenage years that have increased in frequency and severity in the past 3 months. Symptoms are moderate and constant. Location of pain is behind her bilateral eyes, frontal area, and can radiate to her occipital area. She describes the pain as pressure and rates her pain as 6/10 in severity. She is nauseated and sensitive to light and sound with headache. She can trouble focusing and can see bright floaters in her visual field with headache. Frequency of headache is daily. Typical headache can last 1 day. She has tried over the counter medications for headache without relief. She is taking 1600 mg of ibuprofen daily for headache. No relation of headache to menstruation. Her periods are irregular. She recently went on birth control 5 months ago. She is at risk of pregnancy. She handles stress poorly. She has history of anxiety. Her sleep is poor and interrupted. She wakes up feeling tired. She has been told she snores. MRI brain done 3/29/19 showed unremarkable MRI brain. No family history of headache. No history of head injury with loss of consciousness. She denies chest pain. No shortness of breath, no neck pain. No vision changes. No dysphagia. No fever. No rash. No weight loss. History provided by patient. Past Medical History:   Diagnosis Date    Gestational diabetes ? ? started on insulin at 16-20 weeks   ? Patient Active Problem List   Diagnosis    Insulin controlled gestational diabetes mellitus in third trimester    Chronic tonsil/adenoid disease    Vaginal delivery   ? No Known Allergies  ? Current Outpatient Medications   Medication Sig Dispense Refill    ibuprofen (ADVIL;MOTRIN) 800 MG tablet Take 800 mg by mouth every 6 hours as needed for Pain ?  ?    norethindrone-ethinyl estradiol (BALZIVA) 0.4-35 MG-MCG per tablet Take by appearing, and in no distress, oriented to person, place, and time and over weight  Mental status- Level of Alertness: awake  Orientation: person, place, time  Memory: normal  Fund of Knowledge: normal  Attention/Concentration: normal  Language: normal. Mood is normal.   Neck - supple, no significant adenopathy, carotids upstroke normal bilaterally,   There is no carotid bruit. No neck lymphadenopathy. No thyroid enlargement   Neurological -   Cranial [id number]:.   Cranial nerve II: Normal   Cranial nerve III: Pupils: equal, round, reactive to light  Cranial nerves III, IV, VI: Extraocular Movements: intact   Cranial nerve V: Facial sensation: intact   Cranial nerve VII:Facial strength: intact   Cranial nerve VIII: Hearing: intact   Cranial nerve IX: Palate Elevation intact bilaterally  Cranial nerve XI: Shoulder shrug intact bilaterally  Cranial nerve XII: Tongue midline   neck supple without rigidity, there is no limitation of range of motion of the neck. DTR's are Intact distal and symmetric  Babinski sign negative  Motor exam is 5/5 in the upper and lower extremities. Normal muscle tone . There is no muscle atrophy. Sensory is intact for light touch. Coordination: finger to nose, intact  Gait and station intact  Abnormal movement none, vibration normal, proprioception normal  Skin - normal coloration, no rashes, no suspicious skin lesions  Superficial temporal artery pulses are normal.   There is no limitation of range of motion of the neck. There is no resting tremor, no pin rolling, no bradykinesia, no Hypohonia, normal blink rate. Musculoskeletal: Has no hand arthritis, no limitation of ROM in any of the four extremities. There is no leg edema. The Heart was regular in rate and rhythm. No heart murmur  Chest Clear  Abdomen was soft. ?  We reviewed the patient records from referring provider and available information in the EHR   ?   MRI brain done at [address] [place] [address] 3/29/19: normal ? ASSESSMENT:   ?  ? Diagnosis Orders   1. Intractable migraine with aura without status migrainosus  ? This is a 26year old female who presents with headache for years that has increased in severity and frequency in the past 3 months. She underwent MRI brain on 3/29/19 done at [facility] that showed no concerning findings. Her exam is non focal. She is taking up to 1600mg of ibuprofen a day. She was counseled on reducing her use of over the counter NSAIDs as these can cause rebound headache. We will arrange for her to undergo EEG. .  We also counseled the patient on medications, for headache prevention, we will start her on Effexor as off label use for headache as well as folic acid as she is of child bearing age. After detailed discussion with patient we agreed on the following plan. ? Plan   1. Start Effexor 37.5 mg daily   2. Start folic acid 1 mg daily  3. EEG  4. Vitamin B12, folate  5. Reduce the amount of NSAIDs you are taking as these can cause rebound headache  6. Call with any new symptoms or concerns. 7. Follow up in 6 weeks.      Marianna Reyes MD

## 2019-09-29 ENCOUNTER — NURSE TRIAGE (OUTPATIENT)
Dept: OTHER | Facility: CLINIC | Age: 24
End: 2019-09-29

## 2021-01-18 ENCOUNTER — NURSE TRIAGE (OUTPATIENT)
Dept: OTHER | Facility: CLINIC | Age: 26
End: 2021-01-18

## 2021-01-18 NOTE — TELEPHONE ENCOUNTER
diarrhea, constipation, or urine problems? \"        Legs are shaking, patient is crying in pain    11. PREGNANCY: \"Is there any chance you are pregnant? \" \"When was your last menstrual period? \"        Started her period this morning    Protocols used: ABDOMINAL PAIN - FEMALE-ADULT-[place]

## 2021-10-01 ENCOUNTER — NURSE ONLY (OUTPATIENT)
Dept: LAB | Age: 26
End: 2021-10-01

## 2021-10-06 LAB
C. TRACHOMATIS DNA,THIN PREP: NEGATIVE
N. GONORRHOEAE DNA, THIN PREP: NEGATIVE
SOURCE: NORMAL

## 2021-10-07 LAB
APTIMA MEDIA TYPE: NORMAL
CYTOLOGY THIN PREP PAP: NORMAL
T. VAGINALIS SPECIMEN SOURCE: NORMAL
TRICHOMONAS VAGINALIS BY NAA: NEGATIVE

## 2023-04-03 ENCOUNTER — HOSPITAL ENCOUNTER (OUTPATIENT)
Dept: MRI IMAGING | Age: 28
Discharge: HOME | End: 2023-04-03

## 2023-04-03 DIAGNOSIS — Z00.6 EXAMINATION FOR NORMAL COMPARISON OR CONTROL IN CLINICAL RESEARCH: ICD-10-CM

## 2023-04-28 ENCOUNTER — OFFICE VISIT (OUTPATIENT)
Dept: PHYSICAL MEDICINE AND REHAB | Age: 28
End: 2023-04-28
Payer: COMMERCIAL

## 2023-04-28 VITALS
WEIGHT: 136 LBS | SYSTOLIC BLOOD PRESSURE: 134 MMHG | HEIGHT: 61 IN | BODY MASS INDEX: 25.68 KG/M2 | DIASTOLIC BLOOD PRESSURE: 74 MMHG

## 2023-04-28 DIAGNOSIS — M54.12 CERVICAL RADICULOPATHY: ICD-10-CM

## 2023-04-28 DIAGNOSIS — M79.2 NEUROPATHIC PAIN: ICD-10-CM

## 2023-04-28 DIAGNOSIS — M47.812 CERVICAL SPONDYLOSIS: Primary | ICD-10-CM

## 2023-04-28 PROCEDURE — 99214 OFFICE O/P EST MOD 30 MIN: CPT | Performed by: NURSE PRACTITIONER

## 2023-04-28 PROCEDURE — G8427 DOCREV CUR MEDS BY ELIG CLIN: HCPCS | Performed by: NURSE PRACTITIONER

## 2023-04-28 PROCEDURE — 4004F PT TOBACCO SCREEN RCVD TLK: CPT | Performed by: NURSE PRACTITIONER

## 2023-04-28 PROCEDURE — G8419 CALC BMI OUT NRM PARAM NOF/U: HCPCS | Performed by: NURSE PRACTITIONER

## 2023-04-28 RX ORDER — CEFDINIR 300 MG/1
CAPSULE ORAL
COMMUNITY
Start: 2023-04-26

## 2023-04-28 RX ORDER — MELOXICAM 15 MG/1
15 TABLET ORAL DAILY
COMMUNITY
Start: 2023-03-24

## 2023-04-28 RX ORDER — IBUPROFEN 800 MG/1
800 TABLET ORAL EVERY 8 HOURS PRN
Qty: 90 TABLET | Refills: 0 | Status: SHIPPED | OUTPATIENT
Start: 2023-04-28

## 2023-04-28 NOTE — PROGRESS NOTES
Chronic Pain/PM&R Clinic Note     Encounter Date: 4/28/23    Subjective:   Chief Complaint:   Chief Complaint   Patient presents with    New Patient     Neck pain        History of Present Illness:   Raj Lucas is a 24 y.o. female seen in the clinic initially on 04/28/2023 upon request from Itzel Ceja MD  for her history of neck pain. Patient states back around February of this year she developed severe pain in the right side of her neck and into her right arm. She states she had a visit at [facility] and ended up having an MRI of her cervical spine that showed a large disc herniation at C5-6. She states they discussed potential cervical epidural versus surgery. She like to be conservative at this time. She states since that time her pain is improved significantly. At the time she cannot bend her head forward without severe pain in her neck and arm. She states she must sleep on her back or on her left side due to the pain. She has been trying to do more range of motion exercises with her neck. She states her pain is aggravated when picking up something heavy or when holding anything, such as her 3year-old daughter. She states her neck pain is aggravated when she bends her head backwards or side to side. She describes her pain as throbbing with numbness and tingling down to her fingers. Her pain is improved with rest.  She denies any recent falls or gait/balance disturbances. She does not use assistive ice for ambulation. She has not pursued any physical therapy.     History of Interventions:   Surgery: No previous cervical surgeries  Injections: None    Current Treatment Medications:   Mobic 15 mg Taking PRN    Historical Treatment Medications:   Ibuprofen 600-800 mg PRN - effective  Tylenol - ineffective     Imaging:      Past Medical History:   Diagnosis Date    Gestational diabetes     started on insulin at 16-20 weeks    Migraine 2017       Past Surgical History:   Procedure Laterality Date

## 2023-06-23 ENCOUNTER — OFFICE VISIT (OUTPATIENT)
Dept: PHYSICAL MEDICINE AND REHAB | Age: 28
End: 2023-06-23

## 2023-06-23 VITALS
DIASTOLIC BLOOD PRESSURE: 84 MMHG | HEIGHT: 61 IN | WEIGHT: 136 LBS | BODY MASS INDEX: 25.68 KG/M2 | SYSTOLIC BLOOD PRESSURE: 128 MMHG

## 2023-06-23 DIAGNOSIS — M79.2 NEUROPATHIC PAIN: ICD-10-CM

## 2023-06-23 DIAGNOSIS — M54.12 CERVICAL RADICULOPATHY: Primary | ICD-10-CM

## 2023-06-23 DIAGNOSIS — M47.812 CERVICAL SPONDYLOSIS: ICD-10-CM

## 2023-06-23 DIAGNOSIS — M54.81 BILATERAL OCCIPITAL NEURALGIA: ICD-10-CM

## 2023-06-23 RX ORDER — GABAPENTIN 300 MG/1
300 CAPSULE ORAL 3 TIMES DAILY
Qty: 90 CAPSULE | Refills: 0 | Status: SHIPPED | OUTPATIENT
Start: 2023-06-23 | End: 2023-07-23

## 2023-06-23 NOTE — PROGRESS NOTES
Chronic Pain/PM&R Clinic Note     Encounter Date: 23    Subjective:   Chief Complaint:   Chief Complaint   Patient presents with    Follow-up     Increased headaches       History of Present Illness:   Sarahi Nye is a 24 y.o. female seen in the clinic initially on 23 upon request from No ref. provider found  for her history of *** pain. Current Complaints of Pain:   Location: ***   Radiation: {Anatomy; back pain radiation:55075:a}{Radiation:83870}   Severity: {Symptom severity:28618}***   Character/Quality: Complains of pain that is {type:368942}  Timing: {{Pain timetable:05295}  Associated symptoms: {BACK PAIN ASSOCIATED SYMPTOMS:93964}  Numbness: {yes/no:58770}   Weakness: {yes/no:29045}   Exacerbating factors: {causes; aggravators pain back:34343}  Alleviating factors: {pain alleviation:16082}   Length of time pain has been present: Started on ***, recent exacerbation since ***. Inciting event/injury: {inciting event:03482}   Bowel/Bladder incontinence: {incontinence type:14146}   Falls: {falls:87296}   Sleep: Pain {pain occurrence:62281} interferes with sleep.    Physicial Therapy: {PT Yes/None:98869}   Smoker: {smoker:43864}   {Patient also complains of:59214}   {Patient informed us about their upcomin}     History of Interventions:   Surgery: No previous lumbar/cervical surgeries  Injections: None    Current Treatment Medications:       Historical Treatment Medications:     Imaging:      Past Medical History:   Diagnosis Date    Gestational diabetes     started on insulin at 16-20 weeks    Migraine 2017       Past Surgical History:   Procedure Laterality Date    TONSILLECTOMY  2016    TUBAL LIGATION  2019       Family History   Problem Relation Age of Onset    Diabetes Mother     Heart Disease Mother     High Blood Pressure Mother     Diabetes Father     Other Father         stroke several years ago    Breast Cancer Maternal Grandmother          Medications & Allergies:

## 2023-06-23 NOTE — PROGRESS NOTES
Chronic Pain/PM&R Clinic Note     Encounter Date: 6/23/23    Subjective:   Chief Complaint:   Chief Complaint   Patient presents with    Follow-up     Increased headaches       History of Present Illness:   Williams Stewart is a 24 y.o. female seen in the clinic initially on 04/28/2023 upon request from José Miguel Koroma MD  for her history of neck pain. Patient states back around February of this year she developed severe pain in the right side of her neck and into her right arm. She states she had a visit at [facility] and ended up having an MRI of her cervical spine that showed a large disc herniation at C5-6. She states they discussed potential cervical epidural versus surgery. She like to be conservative at this time. She states since that time her pain is improved significantly. At the time she cannot bend her head forward without severe pain in her neck and arm. She states she must sleep on her back or on her left side due to the pain. She has been trying to do more range of motion exercises with her neck. She states her pain is aggravated when picking up something heavy or when holding anything, such as her 3year-old daughter. She states her neck pain is aggravated when she bends her head backwards or side to side. She describes her pain as throbbing with numbness and tingling down to her fingers. Her pain is improved with rest.  She denies any recent falls or gait/balance disturbances. She does not use assistive ice for ambulation. She has not pursued any physical therapy. Today, 6/23/2023, patient presents for planned follow-up on chronic pain. Patient states she completed physical therapy which did help with some of the numbness and tingling she was getting into her arm. She states she still has burning pain in her right shoulder and she has also been getting more headaches. She states it starts at the base of her skull and radiates up the sides of her head.   She has most trouble at night

## 2023-07-18 ENCOUNTER — APPOINTMENT (OUTPATIENT)
Dept: GENERAL RADIOLOGY | Age: 28
End: 2023-07-18
Attending: ANESTHESIOLOGY
Payer: COMMERCIAL

## 2023-07-18 ENCOUNTER — HOSPITAL ENCOUNTER (OUTPATIENT)
Age: 28
Setting detail: OUTPATIENT SURGERY
Discharge: HOME | End: 2023-07-18
Attending: ANESTHESIOLOGY | Admitting: ANESTHESIOLOGY
Payer: COMMERCIAL

## 2023-07-18 VITALS
OXYGEN SATURATION: 97 % | DIASTOLIC BLOOD PRESSURE: 62 MMHG | BODY MASS INDEX: 26.65 KG/M2 | WEIGHT: 144.8 LBS | RESPIRATION RATE: 16 BRPM | TEMPERATURE: 97.4 F | SYSTOLIC BLOOD PRESSURE: 117 MMHG | HEART RATE: 78 BPM | HEIGHT: 62 IN

## 2023-07-18 PROCEDURE — 6360000002 HC RX W HCPCS: Performed by: ANESTHESIOLOGY

## 2023-07-18 PROCEDURE — 2709999900 HC NON-CHARGEABLE SUPPLY: Performed by: ANESTHESIOLOGY

## 2023-07-18 PROCEDURE — 99152 MOD SED SAME PHYS/QHP 5/>YRS: CPT | Performed by: ANESTHESIOLOGY

## 2023-07-18 PROCEDURE — 7100000011 HC PHASE II RECOVERY - ADDTL 15 MIN: Performed by: ANESTHESIOLOGY

## 2023-07-18 PROCEDURE — 62321 NJX INTERLAMINAR CRV/THRC: CPT | Performed by: ANESTHESIOLOGY

## 2023-07-18 PROCEDURE — 2500000003 HC RX 250 WO HCPCS: Performed by: ANESTHESIOLOGY

## 2023-07-18 PROCEDURE — 6360000004 HC RX CONTRAST MEDICATION: Performed by: ANESTHESIOLOGY

## 2023-07-18 PROCEDURE — 7100000010 HC PHASE II RECOVERY - FIRST 15 MIN: Performed by: ANESTHESIOLOGY

## 2023-07-18 PROCEDURE — 3600000054 HC PAIN LEVEL 3 BASE: Performed by: ANESTHESIOLOGY

## 2023-07-18 RX ORDER — LIDOCAINE HYDROCHLORIDE 10 MG/ML
INJECTION, SOLUTION EPIDURAL; INFILTRATION; INTRACAUDAL; PERINEURAL PRN
Status: DISCONTINUED | OUTPATIENT
Start: 2023-07-18 | End: 2023-07-18 | Stop reason: ALTCHOICE

## 2023-07-18 RX ORDER — PANTOPRAZOLE SODIUM 40 MG/1
40 FOR SUSPENSION ORAL
COMMUNITY

## 2023-07-18 RX ORDER — DEXAMETHASONE SODIUM PHOSPHATE 4 MG/ML
INJECTION, SOLUTION INTRA-ARTICULAR; INTRALESIONAL; INTRAMUSCULAR; INTRAVENOUS; SOFT TISSUE PRN
Status: DISCONTINUED | OUTPATIENT
Start: 2023-07-18 | End: 2023-07-18 | Stop reason: ALTCHOICE

## 2023-07-18 RX ORDER — ACYCLOVIR 200 MG/1
CAPSULE ORAL PRN
Status: DISCONTINUED | OUTPATIENT
Start: 2023-07-18 | End: 2023-07-18 | Stop reason: ALTCHOICE

## 2023-07-18 RX ORDER — FENTANYL CITRATE 50 UG/ML
INJECTION, SOLUTION INTRAMUSCULAR; INTRAVENOUS PRN
Status: DISCONTINUED | OUTPATIENT
Start: 2023-07-18 | End: 2023-07-18 | Stop reason: ALTCHOICE

## 2023-07-18 RX ORDER — MIDAZOLAM HYDROCHLORIDE 1 MG/ML
INJECTION INTRAMUSCULAR; INTRAVENOUS PRN
Status: DISCONTINUED | OUTPATIENT
Start: 2023-07-18 | End: 2023-07-18 | Stop reason: ALTCHOICE

## 2023-07-18 ASSESSMENT — PAIN - FUNCTIONAL ASSESSMENT: PAIN_FUNCTIONAL_ASSESSMENT: 0-10

## 2023-07-18 ASSESSMENT — PAIN SCALES - GENERAL: PAINLEVEL_OUTOF10: 3

## 2023-07-18 NOTE — POST SEDATION
[address]  Sedation/Analgesia Post Sedation Record    Pt Name: Julene Hodgkin  MRN: [id number]  YOB: 1995  Procedure Performed By: Dickson Silva DO  Primary Care Physician: No primary care provider on file.     POST-PROCEDURE    Physicians/Assistants: Dickson Silva DO  Procedure Performed: See Procedure Note   Sedation/Anesthesia: Versed and Fentanyl (See procedure note for amount and duration)  Estimated Blood Loss:     0  ml  Specimens Removed: None        Complications: None           Chico Patel DO  Electronically signed 7/18/2023 at 6:02 PM

## 2023-07-18 NOTE — PROGRESS NOTES
1102: Patient arrives to recovery room via cart. Spontaneous respirations, vss, report received from surgical RN. Patient denies pain, numbness, tingling , nausea. Injection site clean, dry, intact. HOB elevated. IV capped. Snack and drink given. Call light within reach. 1115: IV removed, patient getting dressed,  at bedside. 1121: patient ambulated to discharge lobby in stable condition. Patient discharged home with .

## 2023-07-18 NOTE — PROCEDURES
Pre-operative Diagnosis: Radicular arm pain     Post-operative Diagnosis: Radicular arm pain     Procedure: Cervical epidural steroid injection    Procedure Description:  After having obtained a signed informed consent, the patient was taken to the fluoroscopy suite and placed in the prone position. The neck was prepped with chloraprep and draped in a sterile fashion. A total of 1 cc of  1% lidocaine was used to anesthetize the skin and underlying tissues. Under fluoroscopic guidance, a single 20G Tuohy needle was advanced midline at the C6/C7 interspace until gaining the epidural space using the loss of resistance to saline syringe technique. There were no paresthesias, heme, or CSF aspiration. A total of 0.25 cc of Omnipaque 300 were injected having had adequate dye spread within the epidural space. Needle placement was confirmed using the AP and contra-lateral oblique views. 10 mg of dexamethasone flushed with 0.25 cc of saline solution were injected in the epidural space. The needle was removed without any complication. The patient tolerated the procedure well and was transported to the recovery room where he was observed for 15 minutes to then be discharged in ambulatory fashion.       Procedural Complications: None  Estimated Blood Loss: 0 mL      IV sedation was used during the procedure:  - Moderate intravenous conscious sedation was supervised by Dr. Emely Arana  - The patient was independently monitored by a Registered Nurse assigned to the procedure room  - Monitoring included automated blood pressure, continuous EKG, and continuous pulse oximetry  - The detailed conscious record is permanently stored in the [address]  - The following is the conscious sedation record:  Start Time: 10:53  End Time : 11:08  Duration: 15 minutes   Medications Administered: 1 mg Versed, 50 mcg Fentanyl      Chico Gil DO  Interventional Pain Management/PM&R   [facility]

## 2023-08-15 ENCOUNTER — OFFICE VISIT (OUTPATIENT)
Dept: PHYSICAL MEDICINE AND REHAB | Age: 28
End: 2023-08-15

## 2023-08-15 VITALS
HEIGHT: 62 IN | BODY MASS INDEX: 26.5 KG/M2 | WEIGHT: 144 LBS | SYSTOLIC BLOOD PRESSURE: 130 MMHG | DIASTOLIC BLOOD PRESSURE: 74 MMHG

## 2023-08-15 DIAGNOSIS — M47.812 CERVICAL SPONDYLOSIS: ICD-10-CM

## 2023-08-15 DIAGNOSIS — M54.12 CERVICAL RADICULOPATHY: Primary | ICD-10-CM

## 2023-08-15 DIAGNOSIS — M79.2 NEUROPATHIC PAIN: ICD-10-CM

## 2023-08-15 DIAGNOSIS — M54.81 BILATERAL OCCIPITAL NEURALGIA: ICD-10-CM

## 2023-08-15 DIAGNOSIS — M79.18 MYOFASCIAL PAIN: ICD-10-CM

## 2023-08-15 RX ORDER — SUCRALFATE 1 G/1
1 TABLET ORAL 4 TIMES DAILY
COMMUNITY
Start: 2023-07-07 | End: 2023-08-15 | Stop reason: SINTOL

## 2023-08-15 RX ORDER — METHOCARBAMOL 100 MG/ML
100 INJECTION, SOLUTION INTRAMUSCULAR; INTRAVENOUS ONCE
Status: COMPLETED | OUTPATIENT
Start: 2023-08-15 | End: 2023-08-17

## 2023-08-15 RX ORDER — PANTOPRAZOLE SODIUM 40 MG/1
40 TABLET, DELAYED RELEASE ORAL DAILY
COMMUNITY
Start: 2023-08-03

## 2023-08-15 NOTE — PROGRESS NOTES
Pre-operative Diagnosis: Cervical pain     Post-operative Diagnosis: Cervical pain     Procedure: Trigger point injection(s)     Procedure Description:  After having signed the informed consent, the patient was seated in a chair. The patient's cervical region was prepped with alcohol wipes. Trigger points were identified in the right cervical paraspinals and right trapezius for a total of 10 trigger point injections. After negative aspiration, 1 cc of a mixture containing 1:10 100 mg Methocarbamol: 0.25% bupivacaine was injected at each trigger point for a total of 10 trigger points. The patient tolerated the procedure well.      Procedural Complications: None        JOO Sanz - TORI   Interventional Pain Management/PM&R   [address]

## 2023-08-15 NOTE — PROGRESS NOTES
Functionality Assessment/Goals Worksheet     On a scale of 0 (Does not Interfere) to 10 (Completely Interferes)     1. Which number describes how during the past week pain has interfered with           the following:  A. General Activity:  0  B. Mood: 3  C. Walking Ability:  0  D. Normal Work (Includes both work outside the home and housework):  3  E. Relations with Other People:   0  F. Sleep:   3  G. Enjoyment of Life:   0    2. Patient Prefers to Take their Pain Medications:     []  On a regular basis   [x]  Only when necessary    []  Does not take pain medications    3. What are the Patient's Goals/Expectations for Visiting Pain Management?      [x]  Learn about my pain    []  Receive Medication   []  Physical Therapy     []  Treat Depression   []  Receive Injections    []  Treat Sleep   []  Deal with Anxiety and Stress   []  Treat Opoid Dependence/Addiction   []  Other:

## 2023-08-15 NOTE — PROGRESS NOTES
Chronic Pain/PM&R Clinic Note     Encounter Date: 8/15/23    Subjective:   Chief Complaint:   Chief Complaint   Patient presents with    Follow Up After Procedure     cervical 6-7 epidural steroid injection  7/18/23       History of Present Illness:   Julene Hodgkin is a 24 y.o. female seen in the clinic initially on 04/28/2023 upon request from Carmen Wild MD  for her history of neck pain. Patient states back around February of this year she developed severe pain in the right side of her neck and into her right arm. She states she had a visit at [facility] and ended up having an MRI of her cervical spine that showed a large disc herniation at C5-6. She states they discussed potential cervical epidural versus surgery. She like to be conservative at this time. She states since that time her pain is improved significantly. At the time she cannot bend her head forward without severe pain in her neck and arm. She states she must sleep on her back or on her left side due to the pain. She has been trying to do more range of motion exercises with her neck. She states her pain is aggravated when picking up something heavy or when holding anything, such as her 3year-old daughter. She states her neck pain is aggravated when she bends her head backwards or side to side. She describes her pain as throbbing with numbness and tingling down to her fingers. Her pain is improved with rest.  She denies any recent falls or gait/balance disturbances. She does not use assistive ice for ambulation. She has not pursued any physical therapy. Today, 8/15/2023, patient presents for planned follow-up on chronic pain. She underwent the cervical epidural steroid injection at C6-7 on 7/18/2023. She had no relief with this procedure. She states she tried taking the gabapentin but had significant side effects feeling foggy and out of control. She stopped the medication. She continues to take ibuprofen as needed.   She is

## 2023-08-17 RX ADMIN — METHOCARBAMOL 100 MG: 100 INJECTION, SOLUTION INTRAMUSCULAR; INTRAVENOUS at 12:11

## 2023-09-26 ENCOUNTER — OFFICE VISIT (OUTPATIENT)
Dept: PHYSICAL MEDICINE AND REHAB | Age: 28
End: 2023-09-26
Payer: COMMERCIAL

## 2023-09-26 VITALS
DIASTOLIC BLOOD PRESSURE: 62 MMHG | BODY MASS INDEX: 26.5 KG/M2 | HEIGHT: 62 IN | WEIGHT: 144 LBS | SYSTOLIC BLOOD PRESSURE: 114 MMHG

## 2023-09-26 DIAGNOSIS — M47.812 CERVICAL SPONDYLOSIS: ICD-10-CM

## 2023-09-26 DIAGNOSIS — M54.81 BILATERAL OCCIPITAL NEURALGIA: ICD-10-CM

## 2023-09-26 DIAGNOSIS — M79.2 NEUROPATHIC PAIN: ICD-10-CM

## 2023-09-26 DIAGNOSIS — M79.18 MYOFASCIAL PAIN: ICD-10-CM

## 2023-09-26 DIAGNOSIS — M54.12 CERVICAL RADICULOPATHY: Primary | ICD-10-CM

## 2023-09-26 PROCEDURE — 99214 OFFICE O/P EST MOD 30 MIN: CPT | Performed by: NURSE PRACTITIONER

## 2023-09-26 PROCEDURE — 20553 NJX 1/MLT TRIGGER POINTS 3/>: CPT | Performed by: NURSE PRACTITIONER

## 2023-09-26 PROCEDURE — 1036F TOBACCO NON-USER: CPT | Performed by: NURSE PRACTITIONER

## 2023-09-26 PROCEDURE — G8427 DOCREV CUR MEDS BY ELIG CLIN: HCPCS | Performed by: NURSE PRACTITIONER

## 2023-09-26 PROCEDURE — G8419 CALC BMI OUT NRM PARAM NOF/U: HCPCS | Performed by: NURSE PRACTITIONER

## 2023-09-26 RX ORDER — TRIAMCINOLONE ACETONIDE 40 MG/ML
40 INJECTION, SUSPENSION INTRA-ARTICULAR; INTRAMUSCULAR ONCE
Status: COMPLETED | OUTPATIENT
Start: 2023-09-26 | End: 2023-09-26

## 2023-09-26 RX ORDER — MEDROXYPROGESTERONE ACETATE 10 MG/1
TABLET ORAL
COMMUNITY
Start: 2023-09-12

## 2023-09-26 RX ADMIN — TRIAMCINOLONE ACETONIDE 40 MG: 40 INJECTION, SUSPENSION INTRA-ARTICULAR; INTRAMUSCULAR at 16:22

## 2023-11-01 ENCOUNTER — HOSPITAL ENCOUNTER (OUTPATIENT)
Dept: PHYSICAL THERAPY | Age: 28
Setting detail: THERAPIES SERIES
Discharge: HOME | End: 2023-11-01
Payer: COMMERCIAL

## 2023-11-01 PROCEDURE — 97530 THERAPEUTIC ACTIVITIES: CPT

## 2023-11-01 PROCEDURE — 97110 THERAPEUTIC EXERCISES: CPT

## 2023-11-01 PROCEDURE — 97165 OT EVAL LOW COMPLEX 30 MIN: CPT

## 2023-11-01 NOTE — PROGRESS NOTES
** PLEASE SIGN, DATE AND TIME CERTIFICATION BELOW AND RETURN TO [facility] OUTPATIENT REHABILITATION (FAX #: [phone]). ATTEST/CO-SIGN IF ACCESSING VIA [employer]. THANK YOU.**    I certify that I have examined the patient below and determined that Physical Medicine and Rehabilitation service is necessary and that I approve the established plan of care for up to 90 days or as specifically noted.   Attestation, signature or co-signature of physician indicates approval of certification requirements.    ________________________ ____________ __________  Physician Signature   Date   Time  [facility] - SPECIALIZED THERAPY SERVICES  [x] PELVIC HEALTH EVALUATION  [] DAILY NOTE  [] PROGRESS NOTE [] DISCHARGE NOTE    Date: 2023  Patient Name:  Sherly Kessler  : 1995  MRN: [id number]  CSN: [id number]    Referring Practitioner Rm Rodriguez MD   Diagnosis Pelvic and perineal pain   Treatment Diagnosis N81.84 - Pelvic Muscle Wasting (Female) and pelvic pain, low abdominal pain    Date of Evaluation 23    Additional Pertinent History Diagnosis Code    Insulin controlled gestational diabetes mellitus in third trimester O24.414    Chronic tonsil/adenoid disease J35.9    Vaginal delivery O80          Past Medical History:   Diagnosis Date    Acid reflux      Gestational diabetes       started on insulin at 16-20 weeks    Migraine 2017         Past Surgical History:   Procedure Laterality Date    PAIN MANAGEMENT PROCEDURE N/A 2023     cervical 6-7 epidural steroid injection performed by Yury Hernandez DO at [address]   2016    TUBAL LIGATION  EGD and colonoscopy          Functional Outcome Measure Used PPUF   Functional Outcome Score PPUF: 18 (23)       Insurance: Primary: Payor: [facility] [employer] /  /  / ,   Secondary:    Authorization Information: No

## 2023-11-07 ENCOUNTER — OFFICE VISIT (OUTPATIENT)
Dept: PHYSICAL MEDICINE AND REHAB | Age: 28
End: 2023-11-07
Payer: COMMERCIAL

## 2023-11-07 VITALS
WEIGHT: 137.35 LBS | DIASTOLIC BLOOD PRESSURE: 68 MMHG | BODY MASS INDEX: 25.27 KG/M2 | HEIGHT: 62 IN | SYSTOLIC BLOOD PRESSURE: 116 MMHG

## 2023-11-07 DIAGNOSIS — M54.12 CERVICAL RADICULOPATHY: Primary | ICD-10-CM

## 2023-11-07 DIAGNOSIS — M47.812 CERVICAL SPONDYLOSIS: ICD-10-CM

## 2023-11-07 DIAGNOSIS — G24.3 CERVICAL DYSTONIA: ICD-10-CM

## 2023-11-07 DIAGNOSIS — M79.18 MYOFASCIAL PAIN: ICD-10-CM

## 2023-11-07 PROCEDURE — G8484 FLU IMMUNIZE NO ADMIN: HCPCS | Performed by: NURSE PRACTITIONER

## 2023-11-07 PROCEDURE — 99214 OFFICE O/P EST MOD 30 MIN: CPT | Performed by: NURSE PRACTITIONER

## 2023-11-07 PROCEDURE — G8419 CALC BMI OUT NRM PARAM NOF/U: HCPCS | Performed by: NURSE PRACTITIONER

## 2023-11-07 PROCEDURE — G8427 DOCREV CUR MEDS BY ELIG CLIN: HCPCS | Performed by: NURSE PRACTITIONER

## 2023-11-07 PROCEDURE — 1036F TOBACCO NON-USER: CPT | Performed by: NURSE PRACTITIONER

## 2023-11-07 NOTE — PROGRESS NOTES
Functionality Assessment/Goals Worksheet     On a scale of 0 (Does not Interfere) to 10 (Completely Interferes)     1. Which number describes how during the past week pain has interfered with           the following:  A. General Activity:  3  B. Mood: 2  C. Walking Ability:  0  D. Normal Work (Includes both work outside the home and housework):  1  E. Relations with Other People:   0  F. Sleep:   6  G. Enjoyment of Life:   1    2. Patient Prefers to Take their Pain Medications:     []  On a regular basis   [x]  Only when necessary    []  Does not take pain medications    3. What are the Patient's Goals/Expectations for Visiting Pain Management?      []  Learn about my pain    []  Receive Medication   []  Physical Therapy     []  Treat Depression   [x]  Receive Injections    []  Treat Sleep   []  Deal with Anxiety and Stress   []  Treat Opoid Dependence/Addiction   []  Other:

## 2023-11-07 NOTE — PROGRESS NOTES
Chronic Pain/PM&R Clinic Note     Encounter Date: 11/7/23    Subjective:   Chief Complaint:   Chief Complaint   Patient presents with    Follow-up     History of Present Illness:   Mary Ellen Mehta is a 34 y.o. female seen in the clinic initially on 04/28/2023 upon request from Rodrigo Ventura MD  for her history of neck pain. Patient states back around February of this year she developed severe pain in the right side of her neck and into her right arm. She states she had a visit at [facility] and ended up having an MRI of her cervical spine that showed a large disc herniation at C5-6. She states they discussed potential cervical epidural versus surgery. She like to be conservative at this time. She states since that time her pain is improved significantly. At the time she cannot bend her head forward without severe pain in her neck and arm. She states she must sleep on her back or on her left side due to the pain. She has been trying to do more range of motion exercises with her neck. She states her pain is aggravated when picking up something heavy or when holding anything, such as her 3year-old daughter. She states her neck pain is aggravated when she bends her head backwards or side to side. She describes her pain as throbbing with numbness and tingling down to her fingers. Her pain is improved with rest.  She denies any recent falls or gait/balance disturbances. She does not use assistive ice for ambulation. She has not pursued any physical therapy. Today, 9/26/2023, patient presents for planned follow-up on chronic pain. She states the trigger point injections last visit were very beneficial for about 2 weeks then pain started to gradually return. She states for this last week she has been having severe headaches, neck pain. She states she has not been having the radiating pain up the sides of her head as it does seem to stay in the back of her head.   She states she has not been able to sleep

## 2023-11-29 ENCOUNTER — HOSPITAL ENCOUNTER (OUTPATIENT)
Dept: PHYSICAL THERAPY | Age: 28
Setting detail: THERAPIES SERIES
Discharge: HOME | End: 2023-11-29
Payer: COMMERCIAL

## 2023-11-29 PROCEDURE — 97140 MANUAL THERAPY 1/> REGIONS: CPT

## 2023-11-29 PROCEDURE — 97530 THERAPEUTIC ACTIVITIES: CPT

## 2023-12-06 ENCOUNTER — HOSPITAL ENCOUNTER (OUTPATIENT)
Dept: PHYSICAL THERAPY | Age: 28
Setting detail: THERAPIES SERIES
Discharge: HOME | End: 2023-12-06
Payer: COMMERCIAL

## 2023-12-06 PROCEDURE — 97530 THERAPEUTIC ACTIVITIES: CPT

## 2023-12-06 PROCEDURE — 97140 MANUAL THERAPY 1/> REGIONS: CPT

## 2023-12-06 NOTE — PROGRESS NOTES
[address] [facility] THERAPY  OUTPATIENT REHABILITATION - SPECIALIZED THERAPY SERVICES  [] PELVIC HEALTH EVALUATION  [x] DAILY NOTE  [] PROGRESS NOTE [] DISCHARGE NOTE    Date: 2023  Patient Name:  Pricilla Catherine  : 1995  MRN: [id number]  CSN: [id number]    Referring Practitioner Maurilio Nagy MD   Diagnosis  Pelvic and perineal pain   Treatment Diagnosis N81.84 - Pelvic Muscle Wasting (Female) and pelvic pain, low abdominal pain    Date of Evaluation 23    Additional Pertinent History Diagnosis Code    Insulin controlled gestational diabetes mellitus in third trimester O24.414    Chronic tonsil/adenoid disease J35.9    Vaginal delivery O80          Past Medical History:   Diagnosis Date    Acid reflux      Gestational diabetes       started on insulin at 16-20 weeks    Migraine          Past Surgical History:   Procedure Laterality Date    PAIN MANAGEMENT PROCEDURE N/A 2023     cervical 6-7 epidural steroid injection performed by Wu Garcia DO at [address]   2016    TUBAL LIGATION  EGD and colonoscopy          Functional Outcome Measure Used PPUF   Functional Outcome Score PPUF: 18 (23)       Insurance: Primary: Payor: [address] [place] [address] /  /  / ,   Secondary:    Authorization Information: No pre cert   Visit # 3,  for progress note   Visits Allowed: PT, OT, ST 30 visits each calendar year   Recertification Date: 2024   Physician Follow-Up:    Physician Orders: Abeba Odom and treat   History of Present Illness: Pt reports to skilled OT services with c/o L sided pelvic pain that started in July as she was admitted to the ER with abdominal pelvic pain that gave her a fever and pain.  Pt reports that she has had really heavy periods and pain and had an US that revealed a 2in cyst. Pt reports that the pain started suddenly in the RLQ and had some spotting that

## 2023-12-13 ENCOUNTER — HOSPITAL ENCOUNTER (OUTPATIENT)
Dept: PHYSICAL THERAPY | Age: 28
Setting detail: THERAPIES SERIES
Discharge: HOME | End: 2023-12-13
Payer: COMMERCIAL

## 2023-12-13 PROCEDURE — 97140 MANUAL THERAPY 1/> REGIONS: CPT

## 2023-12-13 PROCEDURE — 97110 THERAPEUTIC EXERCISES: CPT

## 2023-12-13 NOTE — PROGRESS NOTES
[address] [facility] THERAPY  OUTPATIENT REHABILITATION - SPECIALIZED THERAPY SERVICES  [] PELVIC HEALTH EVALUATION  [x] DAILY NOTE  [] PROGRESS NOTE [] DISCHARGE NOTE    Date: 2023  Patient Name:  Isaiah Waggoner  : 1995  MRN: [id number]  CSN: [id number]    Referring Practitioner Raul Sher MD   Diagnosis  Pelvic and perineal pain   Treatment Diagnosis N81.84 - Pelvic Muscle Wasting (Female) and pelvic pain, low abdominal pain    Date of Evaluation 23    Additional Pertinent History Diagnosis Code    Insulin controlled gestational diabetes mellitus in third trimester O24.414    Chronic tonsil/adenoid disease J35.9    Vaginal delivery O80          Past Medical History:   Diagnosis Date    Acid reflux      Gestational diabetes       started on insulin at 16-20 weeks    Migraine 2017         Past Surgical History:   Procedure Laterality Date    PAIN MANAGEMENT PROCEDURE N/A 2023     cervical 6-7 epidural steroid injection performed by Geovanni Barriga DO at [address]   2016    TUBAL LIGATION  EGD and colonoscopy          Functional Outcome Measure Used PPUF   Functional Outcome Score PPUF: 18 (23)       Insurance: Primary: Payor: [facility] [employer] /  /  / ,   Secondary:    Authorization Information: No pre cert   Visit # 4,  for progress note   Visits Allowed: PT, OT, ST 30 visits each calendar year   Recertification Date: 2024   Physician Follow-Up:    Physician Orders: Jacinto Ferrer and dhaval   History of Present Illness: Pt reports to skilled OT services with c/o L sided pelvic pain that started in July as she was admitted to the ER with abdominal pelvic pain that gave her a fever and pain.  Pt reports that she has had really heavy periods and pain and had an US that revealed a 2in cyst. Pt reports that the pain started suddenly in the RLQ and had some spotting that

## 2023-12-27 ENCOUNTER — OFFICE VISIT (OUTPATIENT)
Dept: PHYSICAL MEDICINE AND REHAB | Age: 28
End: 2023-12-27

## 2023-12-27 VITALS
HEIGHT: 62 IN | SYSTOLIC BLOOD PRESSURE: 114 MMHG | DIASTOLIC BLOOD PRESSURE: 76 MMHG | BODY MASS INDEX: 25.21 KG/M2 | WEIGHT: 137 LBS

## 2023-12-27 DIAGNOSIS — M54.12 CERVICAL RADICULOPATHY: ICD-10-CM

## 2023-12-27 DIAGNOSIS — M47.812 CERVICAL SPONDYLOSIS: ICD-10-CM

## 2023-12-27 DIAGNOSIS — G24.3 CERVICAL DYSTONIA: Primary | ICD-10-CM

## 2023-12-27 DIAGNOSIS — M79.18 MYOFASCIAL PAIN: ICD-10-CM

## 2023-12-27 NOTE — PROGRESS NOTES
Functionality Assessment/Goals Worksheet     On a scale of 0 (Does not Interfere) to 10 (Completely Interferes)     1. Which number describes how during the past week pain has interfered with           the following:  A. General Activity:  5  B. Mood: 4  C. Walking Ability:  0  D. Normal Work (Includes both work outside the home and housework):  3  E. Relations with Other People:   0  F. Sleep:   7  G. Enjoyment of Life:   3    2. Patient Prefers to Take their Pain Medications:     []  On a regular basis   [x]  Only when necessary    []  Does not take pain medications    3. What are the Patient's Goals/Expectations for Visiting Pain Management?      []  Learn about my pain    [x]  Receive Medication   []  Physical Therapy     []  Treat Depression   []  Receive Injections    []  Treat Sleep   []  Deal with Anxiety and Stress   []  Treat Opoid Dependence/Addiction   []  Other:

## 2023-12-27 NOTE — PROGRESS NOTES
Chronic Pain/PM&R Clinic Note     Encounter Date: 12/27/23    Subjective:   Chief Complaint:   Chief Complaint   Patient presents with    Botox Injection     200 units botox  cervical dystonia      History of Present Illness:   Sylvester Evans is a 34 y.o. female seen in the clinic initially on 04/28/2023 upon request from Gita Chandler MD  for her history of neck pain. Patient states back around February of this year she developed severe pain in the right side of her neck and into her right arm. She states she had a visit at [facility] and ended up having an MRI of her cervical spine that showed a large disc herniation at C5-6. She states they discussed potential cervical epidural versus surgery. She like to be conservative at this time. She states since that time her pain is improved significantly. At the time she cannot bend her head forward without severe pain in her neck and arm. She states she must sleep on her back or on her left side due to the pain. She has been trying to do more range of motion exercises with her neck. She states her pain is aggravated when picking up something heavy or when holding anything, such as her 3year-old daughter. She states her neck pain is aggravated when she bends her head backwards or side to side. She describes her pain as throbbing with numbness and tingling down to her fingers. Her pain is improved with rest.  She denies any recent falls or gait/balance disturbances. She does not use assistive ice for ambulation. She has not pursued any physical therapy. 9/26/2023, patient presents for planned follow-up on chronic pain. She states the trigger point injections last visit were very beneficial for about 2 weeks then pain started to gradually return. She states for this last week she has been having severe headaches, neck pain. She states she has not been having the radiating pain up the sides of her head as it does seem to stay in the back of her head.

## 2023-12-28 NOTE — PROGRESS NOTES
Pre-operative Diagnosis: Cervical Dystonia     Post-operative Diagnosis: Cervical Dystonia     Procedure: Botox for Cervical Dystonia     Procedure Description:  Patient was seated upright on the examination table. Botox was drawn up into two 3 mL syringes to a concentration of 10 units per 0.1 mL. Skin was prepped with alcohol wipes. The following muscles were injected after negative aspiration; The trapezius muscle bilaterally and cervical paraspinal muscles bilaterally. This was a total of 200 units. The patient tolerated the procedure well.       Amount medication wasted: 0 units        Procedural Complications: None        Chico Gil DO  Interventional Pain Management/PM&R   [address]

## 2024-02-06 RX ORDER — IBUPROFEN 800 MG/1
800 TABLET ORAL EVERY 8 HOURS PRN
Qty: 90 TABLET | Refills: 0 | Status: SHIPPED | OUTPATIENT
Start: 2024-02-06

## 2024-02-06 NOTE — TELEPHONE ENCOUNTER
OARRS reviewed. UDS: N/A  Last seen: 11/7/2023. Follow-up:   Future Appointments   Date Time Provider Department Center   2/13/2024 10:00 AM Suma Rao, OT [facility]   2/13/2024 11:20 AM Kenya Toscano APRN - CNP [facility]   2/27/2024 10:00 AM Suma Rao, OT [facility]   3/12/2024 10:00 AM Suma Rao, OT [facility]   3/26/2024 11:00 AM Suma Rao, OT [facility]    PT. DID SEE DR. Gil for botox 12/27 also.  Pt. Called requesting refill Motrin as her headaches have returned.

## 2024-02-13 ENCOUNTER — OFFICE VISIT (OUTPATIENT)
Dept: PHYSICAL MEDICINE AND REHAB | Age: 29
End: 2024-02-13
Payer: COMMERCIAL

## 2024-02-13 ENCOUNTER — HOSPITAL ENCOUNTER (OUTPATIENT)
Dept: PHYSICAL THERAPY | Age: 29
Setting detail: THERAPIES SERIES
Discharge: HOME | End: 2024-02-13
Payer: COMMERCIAL

## 2024-02-13 VITALS
BODY MASS INDEX: 25.21 KG/M2 | HEIGHT: 62 IN | DIASTOLIC BLOOD PRESSURE: 68 MMHG | WEIGHT: 137 LBS | SYSTOLIC BLOOD PRESSURE: 116 MMHG

## 2024-02-13 DIAGNOSIS — M47.812 CERVICAL SPONDYLOSIS: ICD-10-CM

## 2024-02-13 DIAGNOSIS — M54.12 CERVICAL RADICULOPATHY: Primary | ICD-10-CM

## 2024-02-13 DIAGNOSIS — G24.3 CERVICAL DYSTONIA: ICD-10-CM

## 2024-02-13 DIAGNOSIS — G89.4 CHRONIC PAIN SYNDROME: ICD-10-CM

## 2024-02-13 PROCEDURE — G8419 CALC BMI OUT NRM PARAM NOF/U: HCPCS | Performed by: NURSE PRACTITIONER

## 2024-02-13 PROCEDURE — G8484 FLU IMMUNIZE NO ADMIN: HCPCS | Performed by: NURSE PRACTITIONER

## 2024-02-13 PROCEDURE — 99214 OFFICE O/P EST MOD 30 MIN: CPT | Performed by: NURSE PRACTITIONER

## 2024-02-13 PROCEDURE — 97530 THERAPEUTIC ACTIVITIES: CPT

## 2024-02-13 PROCEDURE — 97140 MANUAL THERAPY 1/> REGIONS: CPT

## 2024-02-13 PROCEDURE — G8427 DOCREV CUR MEDS BY ELIG CLIN: HCPCS | Performed by: NURSE PRACTITIONER

## 2024-02-13 PROCEDURE — 1036F TOBACCO NON-USER: CPT | Performed by: NURSE PRACTITIONER

## 2024-02-13 RX ORDER — IBUPROFEN 800 MG/1
800 TABLET ORAL EVERY 8 HOURS PRN
Qty: 90 TABLET | Refills: 2 | Status: SHIPPED | OUTPATIENT
Start: 2024-02-13

## 2024-02-13 NOTE — PROGRESS NOTES
Functionality Assessment/Goals Worksheet     On a scale of 0 (Does not Interfere) to 10 (Completely Interferes)     1.  Which number describes how during the past week pain has interfered with           the following:  A.  General Activity:  2  B.  Mood: 0  C.  Walking Ability:  0  D.  Normal Work (Includes both work outside the home and housework):  1  E.  Relations with Other People:   0  F.  Sleep:   4  G.  Enjoyment of Life:   0    2.  Patient Prefers to Take their Pain Medications:     []  On a regular basis   [x]  Only when necessary    []  Does not take pain medications    3.  What are the Patient's Goals/Expectations for Visiting Pain Management?     []  Learn about my pain    []  Receive Medication   []  Physical Therapy     []  Treat Depression   [x]  Receive Injections    []  Treat Sleep   []  Deal with Anxiety and Stress   []  Treat Opoid Dependence/Addiction   []  Other:                                
      No Known Allergies    Review of Systems:   Constitutional: negative for weight changes or fevers  Genitourinary: negative for bowel/bladder incontinence   Musculoskeletal: positive for right neck and arm pain, headaches  Neurological: positive for right arm weakness and numbness/tingling  Behavioral/Psych: negative for anxiety/depression   All other systems reviewed and are negative    Objective:     Vitals:    02/13/24 1115   BP: 116/68     Constitutional: Pleasant, no acute distress   Head: Normocephalic, atraumatic   Eyes: Conjunctivae normal   Neck: Supple, symmetrical   Respiration: Non-labored breathing   Cardiovascular: Limbs warm and well perfused   Musculoskeletal: Full cervical ROM in all planes. Positive Spurling maneuver right. Increased pain with facet loading. Tenderness to palpation in right cervical paraspinals and other posterior neck musculature (improved).  Right lateral torticollis.  Increased tonicity to the right cervical paraspinals and right trapezius.  Neuro: Alert, oriented. CN II-XII appear grossly intact. Motor strength 5/5 SAb, EF, EE, WE, Estuardo. LT sensation intact in upper limbs. Biceps/triceps reflexes 2+ and symmetrical. Negative Butler bilaterally.   Skin: no skin rashes or lesions noted   Psychological: Cooperative, no exaggerated pain behaviors       Assessment:    Diagnosis Orders   1. Cervical radiculopathy        2. Cervical dystonia        3. Cervical spondylosis        4. Chronic pain syndrome          Heather Holman is a 28 y.o.female presenting to the pain clinic for evaluation of neck and right arm pain. Her history and physical are consistent with cervical radiculopathy secondary to the C5-6 disc herniation.  She has failed medication management and greater than 6 weeks of dedicated physical therapy.  She failed to respond to the cervical epidural steroid injection at C6-7 with Dr. Gil.  In regards to her myofascial pain, she only had 2 weeks of relief with

## 2024-02-13 NOTE — PROGRESS NOTES
** PLEASE SIGN, DATE AND TIME CERTIFICATION BELOW AND RETURN TO [facility] OUTPATIENT REHABILITATION (FAX #: [phone]).  ATTEST/CO-SIGN IF ACCESSING VIA [employer].  THANK YOU.**    I certify that I have examined the patient below and determined that Physical Medicine and Rehabilitation service is necessary and that I approve the established plan of care for up to 90 days or as specifically noted.  Attestation, signature or co-signature of physician indicates approval of certification requirements.    ________________________ ____________ __________  Physician Signature   Date   Time   [facility]  OCCUPATIONAL THERAPY  OUTPATIENT REHABILITATION - SPECIALIZED THERAPY SERVICES  [] PELVIC HEALTH EVALUATION  [] DAILY NOTE  [x] PROGRESS NOTE [] DISCHARGE NOTE    Date: 2024  Patient Name:  Heather Holman  : 1995  MRN: [id number]  CSN: [id number]    Referring Practitioner Adriana Valentin MD   Diagnosis  Pelvic and perineal pain   Treatment Diagnosis N81.84 - Pelvic Muscle Wasting (Female) and pelvic pain, low abdominal pain    Date of Evaluation 23    Additional Pertinent History Diagnosis Code    Insulin controlled gestational diabetes mellitus in third trimester O24.414    Chronic tonsil/adenoid disease J35.9    Vaginal delivery O80          Past Medical History:   Diagnosis Date    Acid reflux      Gestational diabetes       started on insulin at 16-20 weeks    Migraine 2017         Past Surgical History:   Procedure Laterality Date    PAIN MANAGEMENT PROCEDURE N/A 2023     cervical 6-7 epidural steroid injection performed by Chico Gil DO at [facility]    TONSILLECTOMY   2016    TUBAL LIGATION  EGD and colonoscopy          Functional Outcome Measure Used PPUF   Functional Outcome Score PPUF: 18 (23); PPUF: 16 (24)       Insurance: Primary: Payor: [facility] /  /  / ,   Secondary:

## 2024-02-27 ENCOUNTER — HOSPITAL ENCOUNTER (OUTPATIENT)
Dept: PHYSICAL THERAPY | Age: 29
Setting detail: THERAPIES SERIES
Discharge: HOME | End: 2024-02-27
Payer: COMMERCIAL

## 2024-02-27 PROCEDURE — 97140 MANUAL THERAPY 1/> REGIONS: CPT

## 2024-02-27 PROCEDURE — 97530 THERAPEUTIC ACTIVITIES: CPT

## 2024-02-27 NOTE — PROGRESS NOTES
Tilt with leg march       Pelvic Tilt with opposites       Heel walk       Bridge       Pelvic Tilt SLR       Clamshell       Reverse Clamshell                     Standing Kegel       Kegel Mini Squat       Standing Hip 3-way              Maintenance of gains            Specific Interventions Next Treatment: internal PFM release, abdominal STM    Activity/Treatment Tolerance:  [x]  Patient tolerated treatment well  []  Patient limited by fatigue  []  Patient limited by pain   []  Patient limited by medical complications  []  Other:     Patient Education:   [x]  HEP/Education Completed: guided relaxation and diaphragmatic breathing   []  No new Education completed  []  Reviewed Prior HEP      [x]  Patient verbalized and/or demonstrated understanding of education provided.  []  Patient unable to verbalize and/or demonstrate understanding of education provided.  Will continue education.  []  Barriers to learning:     Assessment: Pt tolerated session well this date. Pt was educated for use of a vibrator and possibly a different lube especially for her second day of intercourse in a row for decreased pain. With completion of the internal exam to assess the pelvic floor through the vagina there was noted tightness and tenderness bilaterally L>R along the OI, levator ani, and coccygeus that was released utilizing STM and thieles maneuver to decrease pain and PFM dysfunction. Pt tolerated abdominal STM and connective tissue mobilization and cupping well with noted tightness along the RLQ and LLQ that was released for decreased pain and PFM dysfunction. Pt reports decreased pain upon leaving therapy this date. Pt reports full understanding of all education and instruction provided this date.      Body Structures/Functions/Activity Limitations: PFM weakness with decreased localization and endurance, PFM spasm, Poor PFM control with limited ability to completely relax, Decreased awareness of normal bladder habits, control,

## 2024-02-28 ENCOUNTER — HOSPITAL ENCOUNTER (OUTPATIENT)
Dept: GENERAL RADIOLOGY | Age: 29
Discharge: HOME | End: 2024-02-28

## 2024-02-28 DIAGNOSIS — Z00.6 EXAMINATION FOR NORMAL COMPARISON FOR CLINICAL RESEARCH: ICD-10-CM

## 2024-02-29 ENCOUNTER — HOSPITAL ENCOUNTER (OUTPATIENT)
Dept: GENERAL RADIOLOGY | Age: 29
Discharge: HOME | End: 2024-02-29

## 2024-02-29 ENCOUNTER — HOSPITAL ENCOUNTER (OUTPATIENT)
Dept: MRI IMAGING | Age: 29
Discharge: HOME | End: 2024-02-29
Attending: RADIOLOGY

## 2024-02-29 ENCOUNTER — OFFICE VISIT (OUTPATIENT)
Dept: PHYSICAL MEDICINE AND REHAB | Age: 29
End: 2024-02-29
Payer: COMMERCIAL

## 2024-02-29 VITALS
SYSTOLIC BLOOD PRESSURE: 116 MMHG | HEIGHT: 62 IN | DIASTOLIC BLOOD PRESSURE: 64 MMHG | BODY MASS INDEX: 25.19 KG/M2 | WEIGHT: 136.91 LBS

## 2024-02-29 DIAGNOSIS — Z00.6 EXAMINATION FOR NORMAL COMPARISON FOR CLINICAL RESEARCH: ICD-10-CM

## 2024-02-29 DIAGNOSIS — G89.4 CHRONIC PAIN SYNDROME: ICD-10-CM

## 2024-02-29 DIAGNOSIS — M79.18 MYOFASCIAL PAIN: ICD-10-CM

## 2024-02-29 DIAGNOSIS — M47.816 LUMBAR SPONDYLOSIS: Primary | ICD-10-CM

## 2024-02-29 PROCEDURE — G8427 DOCREV CUR MEDS BY ELIG CLIN: HCPCS | Performed by: NURSE PRACTITIONER

## 2024-02-29 PROCEDURE — 1036F TOBACCO NON-USER: CPT | Performed by: NURSE PRACTITIONER

## 2024-02-29 PROCEDURE — G8419 CALC BMI OUT NRM PARAM NOF/U: HCPCS | Performed by: NURSE PRACTITIONER

## 2024-02-29 PROCEDURE — G8484 FLU IMMUNIZE NO ADMIN: HCPCS | Performed by: NURSE PRACTITIONER

## 2024-02-29 PROCEDURE — 99215 OFFICE O/P EST HI 40 MIN: CPT | Performed by: NURSE PRACTITIONER

## 2024-02-29 NOTE — PROGRESS NOTES
Functionality Assessment/Goals Worksheet     On a scale of 0 (Does not Interfere) to 10 (Completely Interferes)     1.  Which number describes how during the past week pain has interfered with           the following:  A.  General Activity:  8  B.  Mood: 6  C.  Walking Ability:  5  D.  Normal Work (Includes both work outside the home and housework):  8  E.  Relations with Other People:   0  F.  Sleep:   9  G.  Enjoyment of Life:   4    2.  Patient Prefers to Take their Pain Medications:     [x]  On a regular basis   []  Only when necessary    []  Does not take pain medications    3.  What are the Patient's Goals/Expectations for Visiting Pain Management?     []  Learn about my pain    []  Receive Medication   []  Physical Therapy     []  Treat Depression   [x]  Receive Injections    []  Treat Sleep   []  Deal with Anxiety and Stress   []  Treat Opoid Dependence/Addiction   []  Other:

## 2024-02-29 NOTE — PROGRESS NOTES
Chronic Pain/PM&R Clinic Note     Encounter Date: 2/29/24    Subjective:   Chief Complaint: No chief complaint on file.      History of Present Illness:   Heather Holman is a 28 y.o. female seen in the clinic previously for her neck pain but is now been referred for her low back pain upon request from MD Sriram .  Patient states her low back pain started in December without any inciting event.  She states she has been seeing Dr. Bhatia who was sent her to physical therapy which she started early January at [facility].  She has noticed some mild improvement since participating in physical therapy but still continues to struggle with the pain in her back.  She states it is hard for her to  1 position, sit for an extended period of time, bend, and twist without having severe pain in her low back.  She states her left side is worse than her right.  She states physical therapy has tried dry needling, cupping, stimulation, and myofascial release.  She has also tried chiropractic care which does seem to help with her hips but not her low back pain.  She continues to do her core exercises at home on a daily basis.  She has been taking ibuprofen which takes the edge off her pain.  Pain does interfere with sleep as it is hard for her to get comfortable as she is a stomach sleeper.  She denies any leg pain at night.  She does get some aching into the back of her thighs, her left being worse than her right.  She denies any falls and does not use an assistive device for ambulation.  She denies any saddle anesthesia or bowel/bladder incontinence. Dr. Bhatia recommended she pursue facet injections and ablation.    History of Interventions:   Surgery: No previous lumbar surgeries  Injections: None    Current Treatment Medications:   Ibuprofen 800 mg BID-TID PRN - effective    Historical Treatment Medications:   Tylenol - ineffective  Mobic 15 mg   Gabapentin - s/e    Imaging:  Lumbar MRI

## 2024-03-12 ENCOUNTER — HOSPITAL ENCOUNTER (OUTPATIENT)
Dept: PHYSICAL THERAPY | Age: 29
Setting detail: THERAPIES SERIES
Discharge: HOME | End: 2024-03-12
Payer: COMMERCIAL

## 2024-03-12 PROCEDURE — 97140 MANUAL THERAPY 1/> REGIONS: CPT

## 2024-03-12 NOTE — PROGRESS NOTES
[facility]  OCCUPATIONAL THERAPY  OUTPATIENT REHABILITATION - SPECIALIZED THERAPY SERVICES  [] PELVIC HEALTH EVALUATION  [x] DAILY NOTE  [] PROGRESS NOTE [] DISCHARGE NOTE    Date: 3/12/2024  Patient Name:  Heather Holman  : 1995  MRN: [id number]  CSN: [id number]    Referring Practitioner Adriana Valentin MDSarah E Kreider, MD   Diagnosis Pelvic and perineal painPelvic and perineal pain   Treatment Diagnosis N81.84 - Pelvic Muscle Wasting (Female) and pelvic pain, low abdominal pain    Date of Evaluation 23    Additional Pertinent History Diagnosis Code    Insulin controlled gestational diabetes mellitus in third trimester O24.414    Chronic tonsil/adenoid disease J35.9    Vaginal delivery O80          Past Medical History:   Diagnosis Date    Acid reflux      Gestational diabetes       started on insulin at 16-20 weeks    Migraine          Past Surgical History:   Procedure Laterality Date    PAIN MANAGEMENT PROCEDURE N/A 2023     cervical 6-7 epidural steroid injection performed by Chico Gil DO at [facility] OR    TONSILLECTOMY   2016    TUBAL LIGATION  EGD and colonoscopy          Functional Outcome Measure Used PPUF   Functional Outcome Score PPUF: 18 (23); PPUF: 16 (24)       Insurance: Primary: Payor: [facility] /  /  / ,   Secondary:    Authorization Information: No pre cert   Visit # 7, 2/10 for progress note   Visits Allowed: PT, OT, ST 30 visits each calendar year   Recertification Date: May 2024   Physician Follow-Up:    Physician Orders: Eval and treat   History of Present Illness: Pt reports to skilled OT services with c/o L sided pelvic pain that started in July as she was admitted to the ER with abdominal pelvic pain that gave her a fever and pain. Pt reports that she has had really heavy periods and pain and had an US that revealed a 2in cyst. Pt reports that the

## 2024-03-19 NOTE — DISCHARGE INSTRUCTIONS
Post procedure Instructions:    No driving or making significant decisions for the remainder of the day.   Be cautions with walking and activity for 24 hours, do not over exert yourself.  Ok to resume pre-procedure activity level today.  Apply ice to site of injection site if you have pain or discomfort.  Do not submerge sit of injection during bath or pool activities for 48 hours post-procedure.  Resume blood thinning medications in 24 hours.     Call office [phone] if you have:  Temperature greater than 100.4  Persistent nausea and vomiting  Severe uncontrolled pain  Redness, tenderness, or signs of infection (pain, swelling, redness, odor or green/yellow discharge around the site)  Difficulty breathing, headache or visual disturbances  Hives  Persistent dizziness or light-headedness  Extreme fatigue  Any other questions or concerns you may have after discharge    In an emergency, call 911 or go to an Emergency Department at a nearby hospital    “Surgical Site Infections”      How can we work together to prevent Surgical Site Infections?   We would like to thank you for choosing [facility] for your Surgical Care.  Below you will find helpful information on how we can work together to prevent Surgical Site Infections.    What is a Surgical Site Infection (SSI)?  A surgical site infection is an infection that occurs after surgery in the part of the body where the surgery took place. Most patients who have surgery do not develop an infection. However, infections develop in about 1 to 3 out of every 100 patients who have surgery.  Some of the common symptoms of a surgical site infection are:  Redness and pain around the area where you had surgery  Drainage of cloudy fluid from your surgical wound  Fever    Can SSIs be treated?  Yes. Most surgical site infections can be treated with antibiotics. The antibiotic given to you depends on the bacteria (germs) causing the infection. Sometimes patients with

## 2024-03-24 NOTE — H&P
Today, patient presents for planned medial branch blocks at bilateral bilateral lumbar 4/5 and lumbar 5/sacral 1    This note is reflective of the patient's previous visit for evaluation. We will proceed with today's planned procedure. Since patient's last visit for evaluation, there have been no interval changes in medical history. Patient has no new numbness, weakness, or focal neurological deficit since evaluation. Patient has no contraindications to injection (no anticoagulation or recent antibiotic intake for active infections), and has a  present or is able to drive themselves (as discussed and cleared by physician).  Allergies to latex, contrast dye, and steroid medications have been confirmed with the patient prior to the procedure.  NPO necessity has been assessed and accepted based on procedure complexity. The risks and benefits of the procedure have been explained including but are not limited to infection, bleeding, paralysis, immediate post procedure weakness, and dizziness; the patient acknowledges understanding and desires to proceed with the procedure. Patient has signed consent for same procedure as discussed in previous clinic encounter. All other questions and concerns were addressed at bedside. See procedure note for full details.       Post procedure Instructions: The patient was advised not to drive during the day of the procedure and not to engage in any significant decision making (unless otherwise states by physician). The patient was also advised to be cautious with walking/activity for 24 hours following today's visit and asked not to engage in over-exertion (unless otherwise states by physician). After this time, it is ok to resume pre-procedure level of activity. Patient advised to apply ice to site of injection in situations of pain and discomfort. Patient advised to not submerge site of injection during bath or pool activities for approximately 24 hours post-procedure. Patient

## 2024-03-26 ENCOUNTER — APPOINTMENT (OUTPATIENT)
Dept: PHYSICAL THERAPY | Age: 29
End: 2024-03-26
Payer: COMMERCIAL

## 2024-03-26 ENCOUNTER — HOSPITAL ENCOUNTER (OUTPATIENT)
Age: 29
Setting detail: OUTPATIENT SURGERY
Discharge: HOME | End: 2024-03-26
Attending: ANESTHESIOLOGY | Admitting: ANESTHESIOLOGY
Payer: COMMERCIAL

## 2024-03-26 ENCOUNTER — APPOINTMENT (OUTPATIENT)
Dept: GENERAL RADIOLOGY | Age: 29
End: 2024-03-26
Attending: ANESTHESIOLOGY
Payer: COMMERCIAL

## 2024-03-26 VITALS
BODY MASS INDEX: 26.21 KG/M2 | HEIGHT: 61 IN | SYSTOLIC BLOOD PRESSURE: 140 MMHG | OXYGEN SATURATION: 100 % | WEIGHT: 138.8 LBS | HEART RATE: 78 BPM | RESPIRATION RATE: 18 BRPM | TEMPERATURE: 97.9 F | DIASTOLIC BLOOD PRESSURE: 82 MMHG

## 2024-03-26 PROCEDURE — 2709999900 HC NON-CHARGEABLE SUPPLY: Performed by: ANESTHESIOLOGY

## 2024-03-26 PROCEDURE — 2500000003 HC RX 250 WO HCPCS: Performed by: ANESTHESIOLOGY

## 2024-03-26 PROCEDURE — 99152 MOD SED SAME PHYS/QHP 5/>YRS: CPT | Performed by: ANESTHESIOLOGY

## 2024-03-26 PROCEDURE — 3600000056 HC PAIN LEVEL 4 BASE: Performed by: ANESTHESIOLOGY

## 2024-03-26 PROCEDURE — 7100000010 HC PHASE II RECOVERY - FIRST 15 MIN: Performed by: ANESTHESIOLOGY

## 2024-03-26 PROCEDURE — 6360000002 HC RX W HCPCS: Performed by: ANESTHESIOLOGY

## 2024-03-26 PROCEDURE — 64494 INJ PARAVERT F JNT L/S 2 LEV: CPT | Performed by: ANESTHESIOLOGY

## 2024-03-26 PROCEDURE — 7100000011 HC PHASE II RECOVERY - ADDTL 15 MIN: Performed by: ANESTHESIOLOGY

## 2024-03-26 PROCEDURE — 64493 INJ PARAVERT F JNT L/S 1 LEV: CPT | Performed by: ANESTHESIOLOGY

## 2024-03-26 RX ORDER — BUPIVACAINE HYDROCHLORIDE 5 MG/ML
INJECTION, SOLUTION PERINEURAL PRN
Status: DISCONTINUED | OUTPATIENT
Start: 2024-03-26 | End: 2024-03-26 | Stop reason: ALTCHOICE

## 2024-03-26 RX ORDER — LIDOCAINE HYDROCHLORIDE 10 MG/ML
INJECTION, SOLUTION EPIDURAL; INFILTRATION; INTRACAUDAL; PERINEURAL PRN
Status: DISCONTINUED | OUTPATIENT
Start: 2024-03-26 | End: 2024-03-26 | Stop reason: ALTCHOICE

## 2024-03-26 RX ORDER — MIDAZOLAM HYDROCHLORIDE 1 MG/ML
INJECTION INTRAMUSCULAR; INTRAVENOUS PRN
Status: DISCONTINUED | OUTPATIENT
Start: 2024-03-26 | End: 2024-03-26 | Stop reason: ALTCHOICE

## 2024-03-26 RX ORDER — FENTANYL CITRATE 50 UG/ML
INJECTION, SOLUTION INTRAMUSCULAR; INTRAVENOUS PRN
Status: DISCONTINUED | OUTPATIENT
Start: 2024-03-26 | End: 2024-03-26 | Stop reason: ALTCHOICE

## 2024-03-26 ASSESSMENT — PAIN - FUNCTIONAL ASSESSMENT
PAIN_FUNCTIONAL_ASSESSMENT: 0-10
PAIN_FUNCTIONAL_ASSESSMENT: NONE - DENIES PAIN

## 2024-03-26 ASSESSMENT — PAIN DESCRIPTION - DESCRIPTORS: DESCRIPTORS: ACHING

## 2024-03-26 NOTE — POST SEDATION
[facility] [place]  Sedation/Analgesia Post Sedation Record    Pt Name: Heather Holman  MRN: [id number]  YOB: 1995  Procedure Performed By: Chico Gil DO  Primary Care Physician: No primary care provider on file.    POST-PROCEDURE    Physicians/Assistants: Chico Gil DO  Procedure Performed: See Procedure Note   Sedation/Anesthesia: Versed and Fentanyl (See procedure note for amount and duration)  Estimated Blood Loss:     0  ml  Specimens Removed: None        Complications: None           Chico Gil DO  Electronically signed 3/26/2024 at 1:19 PM

## 2024-03-26 NOTE — PROGRESS NOTES
1109- Patient to Phase II via cart. Report received from Vicki GAMBOA. Patient drowsy but responsive.Vitals obtained and stable. Respirations even and unlabored on room air. Patient denies pain, nausea, numbness and tingling. Patient able to move all extremities. No drainage noted at injection sites. Patient instructed to stay in bed. Instructed on call light use.     1112- Pt eating and drinking at this time.    1115- Dutch in room at this time    1120- Pt's IV removed at this time.    1125- Pt getting changed at this time.    1130- Patient meets discharge criteria. Discharged in stable condition. Patient brought to car via wheelchair with assistance from RN. Patient tolerated well. All belongings sent with patient.

## 2024-03-26 NOTE — PRE SEDATION
[facility]  Pre-Sedation/Analgesia History & Physical    Pt Name: Heather Holman  MRN: [id number]  YOB: 1995  Provider Performing Procedure: Chico Gil DO   Primary Care Physician: No primary care provider on file.      MEDICAL HISTORY       has a past medical history of Acid reflux, Gestational diabetes, and Migraine.  SURGICAL HISTORY   has a past surgical history that includes Tonsillectomy (02/29/2016); Tubal ligation (2019); and Pain management procedure (N/A, 7/18/2023).    ALLERGIES   Allergies as of 02/29/2024    (No Known Allergies)       MEDICATIONS   Prior to Admission medications    Medication Sig Start Date End Date Taking? Authorizing Provider   ibuprofen (ADVIL;MOTRIN) 800 MG tablet Take 1 tablet by mouth every 8 hours as needed for Pain 2/13/24   Kenya Toscano, APRN - CNP     PHYSICAL:   Vitals:    03/26/24 1109   BP: (!) 140/82   Pulse: 78   Resp: 18   Temp: 97.9 °F (36.6 °C)   SpO2: 100%     PLANNED PROCEDURE   See procedure note  SEDATION  Planned agent: Versed and Fentanyl  ASA Classification: 2  Class 1: A normal healthy patient  Class 2: Pt with mild to moderate systemic disease  Class 3: Severe systemic disease or disturbance  Class 4: Severe systemic disorders that are already life threatening.  Class 5: Moribund pt with little chances of survival, for more than 24 hours.  Mallampati I Airway Classification: 2    1. Pre-procedure diagnostic studies complete and results available.  2. Previous sedation/anesthesia experiences assessed.  3. The patient is an appropriate candidate to undergo the planned procedure sedation and anesthesia. (Refer to nursing sedation/analgesia documentation record)  4. Formulation and discussion of sedation/procedure plan, risks, and expectations with patient and/or responsible adult completed.  5. Patient examined immediately prior to the procedure. (Refer to nursing sedation/analgesia documentation record)    Chico AZEVEDO

## 2024-03-26 NOTE — PROCEDURES
Pre-operative Diagnosis: Lumbar facet pain     Post-operative Diagnosis: Lumbar facet pain     Procedure: Bilateral lumbar medial branch blocks targeting facet joints of L4/L5 and L5/S1     Procedure Description:  After consent was obtained, the patient was placed in the prone position with a pillow under the abdomen to reduce the lordotic curve of the lumbar spine.  The lower back was prepped with chloraprep and draped in a sterile fashion. Then, 0.5 cc of 1 % lidocaine was used for local anesthesia of the skin and superficial subcutaneous tissues.  Three 22-gauge 3.5-inch spinal needles were advanced under fluoroscopy in an AP view: one at the sacral ala and two at the junction of the right superior articular process and the transverse process of the L4 and L5 vertebrae. There was no paresthesias, heme, or CSF obtained.  Needle placement was confirmed using AP and oblique views. Then, 0.5 cc of 0.5% bupivacaine was injected at each site without complications. The procedure was repeated on the contralateral side. The patient tolerated the procedure well, transported to the recovery room, and discharged in ambulatory fashion.     Procedural Complications: None  Estimated Blood Loss: 0 mL      IV sedation was used during the procedure:  - Moderate intravenous conscious sedation was supervised by Dr. Gil  - The patient was independently monitored by a Registered Nurse assigned to the procedure room  - Monitoring included automated blood pressure, continuous EKG, and continuous pulse oximetry  - The detailed conscious record is permanently stored in the Hospital Information System  - The following is the conscious sedation record:  Start Time: 11:01  End Time : 11:16  Duration: 15 minutes   Medications Administered: 2 mg Versed, 100 mcg Fentanyl     Chico Gil DO  Interventional Pain Management/PM&R   [facility] and [facility]

## 2024-03-27 ENCOUNTER — OFFICE VISIT (OUTPATIENT)
Dept: PHYSICAL MEDICINE AND REHAB | Age: 29
End: 2024-03-27
Payer: COMMERCIAL

## 2024-03-27 VITALS
WEIGHT: 138.89 LBS | SYSTOLIC BLOOD PRESSURE: 124 MMHG | BODY MASS INDEX: 26.22 KG/M2 | HEIGHT: 61 IN | DIASTOLIC BLOOD PRESSURE: 70 MMHG

## 2024-03-27 DIAGNOSIS — M47.816 LUMBAR SPONDYLOSIS: Primary | ICD-10-CM

## 2024-03-27 DIAGNOSIS — G89.4 CHRONIC PAIN SYNDROME: ICD-10-CM

## 2024-03-27 DIAGNOSIS — M51.36 DEGENERATIVE DISC DISEASE, LUMBAR: ICD-10-CM

## 2024-03-27 DIAGNOSIS — M79.18 MYOFASCIAL PAIN: ICD-10-CM

## 2024-03-27 PROCEDURE — G8484 FLU IMMUNIZE NO ADMIN: HCPCS | Performed by: ANESTHESIOLOGY

## 2024-03-27 PROCEDURE — G8419 CALC BMI OUT NRM PARAM NOF/U: HCPCS | Performed by: ANESTHESIOLOGY

## 2024-03-27 PROCEDURE — G8427 DOCREV CUR MEDS BY ELIG CLIN: HCPCS | Performed by: ANESTHESIOLOGY

## 2024-03-27 PROCEDURE — 99214 OFFICE O/P EST MOD 30 MIN: CPT | Performed by: ANESTHESIOLOGY

## 2024-03-27 PROCEDURE — 1036F TOBACCO NON-USER: CPT | Performed by: ANESTHESIOLOGY

## 2024-03-27 NOTE — PROGRESS NOTES
[facility] PROFESSIONAL SERVS  [facility] NEUROSCIENCE AND REHABILITATION [address]  [facility] [address]  Dept: [phone]  Dept Fax: [phone]  Loc: [phone]    Visit Date: 3/27/2024    Functionality Assessment/Goals Worksheet     On a scale of 0 (Does not Interfere) to 10 (Completely Interferes)     1.  Which number describes how during the past week pain has interfered with       the following:  A.  General Activity:  0  B.  Mood: 2  C.  Walking Ability:  0  D.  Normal Work (Includes both work outside the home and housework):  0  E.  Relations with Other People:   0  F.  Sleep:   5  G.  Enjoyment of Life:   0    2.  Patient Prefers to Take their Pain Medications:     []  On a regular basis   [x]  Only when necessary    []  Does not take pain medications    3.  What are the Patient's Goals/Expectations for Visiting Pain Management?     []  Learn about my pain    [x]  Receive Medication   []  Physical Therapy     []  Treat Depression   [x]  Receive Injections    []  Treat Sleep   []  Deal with Anxiety and Stress   []  Treat Opoid Dependence/Addiction   []  Other:  
modification.  She continues to do her core exercises daily at home.  She continues to have axial low back pain.  I have set her up for a lumbar facet medial branch block targeting bilateral L4-5 and L5-S1 with Dr. Gil with fluoroscopy.  We discussed the potential of lumbar radiofrequency ablation.    She responded significantly to her diagnostic lumbar medial branch blocks I set her up for confirmatory blocks at the same levels.      Plan:   The following treatment recommendations and plan were discussed in detail with Heather Holman.    Imaging:   I have reviewed patient’s imaging of lumbar MRI and results were discussed with patient today.     Analgesics:   Patient is taking ibuprofen. Patient is advised to take as prescribed and not take on an empty stomach.     Adjuvants:   None     Interventions:   In presence of lumbar axial back pain and with physical exam consistent for facetal pain, confirmatory medial branch blocks at bilateral bilateral lumbar 4/5 and lumbar 5/sacral 1 was chosen. The risks and benefits were discussed in detail with the patient. Patient wants to proceed with the injection.    Anticoagulation/NPO Recommendations:   Patient does need to hold ibuprofen x 2 days prior to the procedure.  Patient will need to be NPO x 8 hours prior to the procedure.  We will start an IV prior to the procedure    Multidisciplinary Pain Management:   In the presence of complex, chronic, and multi-factorial pain, the importance of a multidisciplinary approach to pain management in the patient’s management regimen was emphasized and discussed in great detail.   PHYSICAL THERAPY: Patient is advised to see a physical therapist for gentle stretching exercises and conditioning exercises for management of pain.     Referrals:  None     Prescriptions Written This Visit:   None     Follow-up: Confirmatory lumbar MBBs      Chico Gil, DO  Interventional Pain Management/PM&R   [facility] and

## 2024-04-09 ENCOUNTER — APPOINTMENT (OUTPATIENT)
Dept: PHYSICAL THERAPY | Age: 29
End: 2024-04-09
Payer: COMMERCIAL

## 2024-04-12 NOTE — DISCHARGE INSTRUCTIONS
Post procedure Instructions:    No driving or making significant decisions for the remainder of the day.   Be cautions with walking and activity for 24 hours, do not over exert yourself.  Ok to resume pre-procedure activity level today.  Apply ice to site of injection site if you have pain or discomfort.  Do not submerge sit of injection during bath or pool activities for 48 hours post-procedure.  Resume blood thinning medications in 24 hours.     Call office [phone] if you have:  Temperature greater than 100.4  Persistent nausea and vomiting  Severe uncontrolled pain  Redness, tenderness, or signs of infection (pain, swelling, redness, odor or green/yellow discharge around the site)  Difficulty breathing, headache or visual disturbances  Hives  Persistent dizziness or light-headedness  Extreme fatigue  Any other questions or concerns you may have after discharge    In an emergency, call 911 or go to an Emergency Department at a nearby hospital    “Surgical Site Infections”      How can we work together to prevent Surgical Site Infections?   We would like to thank you for choosing [facility] for your Surgical Care.  Below you will find helpful information on how we can work together to prevent Surgical Site Infections.    What is a Surgical Site Infection (SSI)?  A surgical site infection is an infection that occurs after surgery in the part of the body where the surgery took place. Most patients who have surgery do not develop an infection. However, infections develop in about 1 to 3 out of every 100 patients who have surgery.  Some of the common symptoms of a surgical site infection are:  Redness and pain around the area where you had surgery  Drainage of cloudy fluid from your surgical wound  Fever    Can SSIs be treated?  Yes. Most surgical site infections can be treated with antibiotics. The antibiotic given to you depends on the bacteria (germs) causing the infection. Sometimes patients with

## 2024-04-16 NOTE — H&P
of physical therapy as well as NSAID therapy for greater than 6 weeks and activity modification.  She continues to do her core exercises daily at home.  She continues to have axial low back pain.  I have set her up for a lumbar facet medial branch block targeting bilateral L4-5 and L5-S1 with Dr. Gil with fluoroscopy.  We discussed the potential of lumbar radiofrequency ablation.    She responded significantly to her diagnostic lumbar medial branch blocks I set her up for confirmatory blocks at the same levels.      Plan:   The following treatment recommendations and plan were discussed in detail with Heather Holman.    Imaging:   I have reviewed patient’s imaging of lumbar MRI and results were discussed with patient today.     Analgesics:   Patient is taking ibuprofen. Patient is advised to take as prescribed and not take on an empty stomach.     Adjuvants:   None     Interventions:   In presence of lumbar axial back pain and with physical exam consistent for facetal pain, confirmatory medial branch blocks at bilateral bilateral lumbar 4/5 and lumbar 5/sacral 1 was chosen. The risks and benefits were discussed in detail with the patient. Patient wants to proceed with the injection.    Anticoagulation/NPO Recommendations:   Patient does need to hold ibuprofen x 2 days prior to the procedure.  Patient will need to be NPO x 8 hours prior to the procedure.  We will start an IV prior to the procedure    Multidisciplinary Pain Management:   In the presence of complex, chronic, and multi-factorial pain, the importance of a multidisciplinary approach to pain management in the patient’s management regimen was emphasized and discussed in great detail.   PHYSICAL THERAPY: Patient is advised to see a physical therapist for gentle stretching exercises and conditioning exercises for management of pain.     Referrals:  None     Prescriptions Written This Visit:   None     Follow-up: Confirmatory lumbar MBBs      Chico

## 2024-04-17 ENCOUNTER — HOSPITAL ENCOUNTER (OUTPATIENT)
Age: 29
Setting detail: OUTPATIENT SURGERY
Discharge: HOME | End: 2024-04-17
Attending: ANESTHESIOLOGY | Admitting: ANESTHESIOLOGY
Payer: COMMERCIAL

## 2024-04-17 ENCOUNTER — APPOINTMENT (OUTPATIENT)
Dept: GENERAL RADIOLOGY | Age: 29
End: 2024-04-17
Attending: ANESTHESIOLOGY
Payer: COMMERCIAL

## 2024-04-17 VITALS
DIASTOLIC BLOOD PRESSURE: 69 MMHG | SYSTOLIC BLOOD PRESSURE: 128 MMHG | HEART RATE: 69 BPM | OXYGEN SATURATION: 95 % | TEMPERATURE: 97.3 F | WEIGHT: 138 LBS | BODY MASS INDEX: 26.06 KG/M2 | HEIGHT: 61 IN | RESPIRATION RATE: 16 BRPM

## 2024-04-17 PROCEDURE — 3600000056 HC PAIN LEVEL 4 BASE: Performed by: ANESTHESIOLOGY

## 2024-04-17 PROCEDURE — 99152 MOD SED SAME PHYS/QHP 5/>YRS: CPT | Performed by: ANESTHESIOLOGY

## 2024-04-17 PROCEDURE — 2709999900 HC NON-CHARGEABLE SUPPLY: Performed by: ANESTHESIOLOGY

## 2024-04-17 PROCEDURE — 64494 INJ PARAVERT F JNT L/S 2 LEV: CPT | Performed by: ANESTHESIOLOGY

## 2024-04-17 PROCEDURE — 2500000003 HC RX 250 WO HCPCS: Performed by: ANESTHESIOLOGY

## 2024-04-17 PROCEDURE — 7100000010 HC PHASE II RECOVERY - FIRST 15 MIN: Performed by: ANESTHESIOLOGY

## 2024-04-17 PROCEDURE — 7100000011 HC PHASE II RECOVERY - ADDTL 15 MIN: Performed by: ANESTHESIOLOGY

## 2024-04-17 PROCEDURE — 64493 INJ PARAVERT F JNT L/S 1 LEV: CPT | Performed by: ANESTHESIOLOGY

## 2024-04-17 PROCEDURE — 6360000002 HC RX W HCPCS: Performed by: ANESTHESIOLOGY

## 2024-04-17 RX ORDER — LIDOCAINE HYDROCHLORIDE 10 MG/ML
INJECTION, SOLUTION EPIDURAL; INFILTRATION; INTRACAUDAL; PERINEURAL PRN
Status: DISCONTINUED | OUTPATIENT
Start: 2024-04-17 | End: 2024-04-17 | Stop reason: ALTCHOICE

## 2024-04-17 RX ORDER — MIDAZOLAM HYDROCHLORIDE 1 MG/ML
INJECTION INTRAMUSCULAR; INTRAVENOUS PRN
Status: DISCONTINUED | OUTPATIENT
Start: 2024-04-17 | End: 2024-04-17 | Stop reason: ALTCHOICE

## 2024-04-17 RX ORDER — BUPIVACAINE HYDROCHLORIDE 5 MG/ML
INJECTION, SOLUTION PERINEURAL PRN
Status: DISCONTINUED | OUTPATIENT
Start: 2024-04-17 | End: 2024-04-17 | Stop reason: ALTCHOICE

## 2024-04-17 RX ORDER — FENTANYL CITRATE 50 UG/ML
INJECTION, SOLUTION INTRAMUSCULAR; INTRAVENOUS PRN
Status: DISCONTINUED | OUTPATIENT
Start: 2024-04-17 | End: 2024-04-17 | Stop reason: ALTCHOICE

## 2024-04-17 ASSESSMENT — PAIN - FUNCTIONAL ASSESSMENT
PAIN_FUNCTIONAL_ASSESSMENT: 0-10
PAIN_FUNCTIONAL_ASSESSMENT: 0-10

## 2024-04-17 NOTE — PRE SEDATION
procedure. (Refer to nursing sedation/analgesia documentation record)    Chico Gil DO  Electronically signed 4/17/2024 at 4:58 PM

## 2024-04-17 NOTE — PROGRESS NOTES
Discharge instructions reviewed with patient.  All questions were addressed and answered.  Patient  verbalized understanding of discharge plan.

## 2024-04-17 NOTE — PROGRESS NOTES
1110 Patient to phase 2 from surgery, report from Sarah GAMBOA. Patient alert, oriented, appropriate. Patient states pain less than when she came in. Vitals stable on room air. Injection site with no complications noted. Call light in reach.   1114 Snack and drink given per preference.   1130 IV taken out and dressing applied with no complications.   1135 Patient walked to discharge doors in stable condition. Discharged with AVS and all belongings.

## 2024-04-17 NOTE — POST SEDATION
[facility] [place]  Sedation/Analgesia Post Sedation Record    Pt Name: Heather Holman  MRN: [id number]  YOB: 1995  Procedure Performed By: Chico Gil DO  Primary Care Physician: No primary care provider on file.    POST-PROCEDURE    Physicians/Assistants: Chico Gil DO  Procedure Performed: See Procedure Note   Sedation/Anesthesia: Versed and Fentanyl (See procedure note for amount and duration)  Estimated Blood Loss:     0  ml  Specimens Removed: None        Complications: None           Chico Gil DO  Electronically signed 4/17/2024 at 4:58 PM

## 2024-04-17 NOTE — PROCEDURES
Pre-operative Diagnosis: Lumbar facet pain     Post-operative Diagnosis: Lumbar facet pain     Procedure: Bilateral lumbar medial branch blocks targeting facet joints of L4/L5 and L5/S1     Procedure Description:  After consent was obtained, the patient was placed in the prone position with a pillow under the abdomen to reduce the lordotic curve of the lumbar spine.  The lower back was prepped with chloraprep and draped in a sterile fashion. Then, 0.5 cc of 1 % lidocaine was used for local anesthesia of the skin and superficial subcutaneous tissues.  Three 22-gauge 3.5-inch spinal needles were advanced under fluoroscopy in an AP view: one at the sacral ala and two at the junction of the right superior articular process and the transverse process of the L4 and L5 vertebrae. There was no paresthesias, heme, or CSF obtained.  Needle placement was confirmed using AP and oblique views. Then, 0.5 cc of 0.5% bupivacaine was injected at each site without complications. The procedure was repeated on the contralateral side. The patient tolerated the procedure well, transported to the recovery room, and discharged in ambulatory fashion.     Procedural Complications: None  Estimated Blood Loss: 0 mL        IV sedation was used during the procedure:  - Moderate intravenous conscious sedation was supervised by Dr. Gil  - The patient was independently monitored by a Registered Nurse assigned to the procedure room  - Monitoring included automated blood pressure, continuous EKG, and continuous pulse oximetry  - The detailed conscious record is permanently stored in the Hospital Information System  - The following is the conscious sedation record:  Start Time: 11:01  End Time : 11:16  Duration: 15 minutes   Medications Administered: 3 mg Versed, 100 mcg Fentanyl     Chico Gil DO  Interventional Pain Management/PM&R   [facility] and [facility]

## 2024-04-23 ENCOUNTER — HOSPITAL ENCOUNTER (OUTPATIENT)
Dept: PHYSICAL THERAPY | Age: 29
Setting detail: THERAPIES SERIES
Discharge: HOME | End: 2024-04-23
Payer: COMMERCIAL

## 2024-04-23 PROCEDURE — 97140 MANUAL THERAPY 1/> REGIONS: CPT

## 2024-04-23 PROCEDURE — 97110 THERAPEUTIC EXERCISES: CPT

## 2024-04-23 NOTE — PROGRESS NOTES
outlined above.  [x]  Continue with current plan of care  []  Modify plan of care as follows:    []  Hold pending physician visit  []  Discharge    Time In 1100   Time Out 1155   Timed Code Minutes: 55 min   Total Treatment Time: 55 min       Electronically Signed by: Suma MOON, OTR/L [id number]

## 2024-04-24 ENCOUNTER — OFFICE VISIT (OUTPATIENT)
Dept: PHYSICAL MEDICINE AND REHAB | Age: 29
End: 2024-04-24
Payer: COMMERCIAL

## 2024-04-24 VITALS
SYSTOLIC BLOOD PRESSURE: 118 MMHG | DIASTOLIC BLOOD PRESSURE: 86 MMHG | HEIGHT: 61 IN | WEIGHT: 138 LBS | BODY MASS INDEX: 26.06 KG/M2

## 2024-04-24 DIAGNOSIS — G24.3 CERVICAL DYSTONIA: ICD-10-CM

## 2024-04-24 DIAGNOSIS — M47.816 LUMBAR SPONDYLOSIS: Primary | ICD-10-CM

## 2024-04-24 DIAGNOSIS — G89.4 CHRONIC PAIN SYNDROME: ICD-10-CM

## 2024-04-24 DIAGNOSIS — M79.18 MYOFASCIAL PAIN: ICD-10-CM

## 2024-04-24 DIAGNOSIS — M54.12 CERVICAL RADICULOPATHY: ICD-10-CM

## 2024-04-24 PROCEDURE — 20553 NJX 1/MLT TRIGGER POINTS 3/>: CPT | Performed by: NURSE PRACTITIONER

## 2024-04-24 PROCEDURE — 1036F TOBACCO NON-USER: CPT | Performed by: NURSE PRACTITIONER

## 2024-04-24 PROCEDURE — G8419 CALC BMI OUT NRM PARAM NOF/U: HCPCS | Performed by: NURSE PRACTITIONER

## 2024-04-24 PROCEDURE — 99214 OFFICE O/P EST MOD 30 MIN: CPT | Performed by: NURSE PRACTITIONER

## 2024-04-24 PROCEDURE — G8427 DOCREV CUR MEDS BY ELIG CLIN: HCPCS | Performed by: NURSE PRACTITIONER

## 2024-04-24 RX ORDER — TRIAMCINOLONE ACETONIDE 40 MG/ML
40 INJECTION, SUSPENSION INTRA-ARTICULAR; INTRAMUSCULAR ONCE
Status: COMPLETED | OUTPATIENT
Start: 2024-04-24 | End: 2024-04-24

## 2024-04-24 RX ORDER — IBUPROFEN 800 MG/1
800 TABLET ORAL EVERY 8 HOURS PRN
Qty: 90 TABLET | Refills: 2 | Status: SHIPPED | OUTPATIENT
Start: 2024-04-24

## 2024-04-24 RX ADMIN — TRIAMCINOLONE ACETONIDE 40 MG: 40 INJECTION, SUSPENSION INTRA-ARTICULAR; INTRAMUSCULAR at 16:38

## 2024-04-24 NOTE — PROGRESS NOTES
Chronic Pain/PM&R Clinic Note     Encounter Date: 3/27/24    Subjective:   Chief Complaint:   Chief Complaint   Patient presents with    Follow Up After Procedure     medial branch blocks bilateral lumbar 4-5, 5-sacral 1 4/17/24    Injections     TPI     Medication Refill     Ibuprofen        History of Present Illness:   Heather Holman is a 28 y.o. female seen in the clinic previously for her neck pain but is now been referred for her low back pain upon request from MD Sriram .  Patient states her low back pain started in December without any inciting event.  She states she has been seeing Dr. Bhatia who was sent her to physical therapy which she started early January at [facility] in [place].  She has noticed some mild improvement since participating in physical therapy but still continues to struggle with the pain in her back.  She states it is hard for her to  1 position, sit for an extended period of time, bend, and twist without having severe pain in her low back.  She states her left side is worse than her right.  She states physical therapy has tried dry needling, cupping, stimulation, and myofascial release.  She has also tried chiropractic care which does seem to help with her hips but not her low back pain.  She continues to do her core exercises at home on a daily basis.  She has been taking ibuprofen which takes the edge off her pain.  Pain does interfere with sleep as it is hard for her to get comfortable as she is a stomach sleeper.  She denies any leg pain at night.  She does get some aching into the back of her thighs, her left being worse than her right.  She denies any falls and does not use an assistive device for ambulation.  She denies any saddle anesthesia or bowel/bladder incontinence. Dr. Bhatia recommended she pursue facet injections and ablation.    Today, 3/27/2024, patient presents for planned follow-up for management of chronic neck pain and low back pain.  Her

## 2024-04-24 NOTE — PROGRESS NOTES
Functionality Assessment/Goals Worksheet     On a scale of 0 (Does not Interfere) to 10 (Completely Interferes)     1.  Which number describes how during the past week pain has interfered with           the following:  A.  General Activity:  4  B.  Mood: 3  C.  Walking Ability:  0  D.  Normal Work (Includes both work outside the home and housework):  4  E.  Relations with Other People:   0  F.  Sleep:   7  G.  Enjoyment of Life:   2    2.  Patient Prefers to Take their Pain Medications:     [x]  On a regular basis   [x]  Only when necessary    []  Does not take pain medications    3.  What are the Patient's Goals/Expectations for Visiting Pain Management?     []  Learn about my pain    [x]  Receive Medication   []  Physical Therapy     []  Treat Depression   [x]  Receive Injections    []  Treat Sleep   []  Deal with Anxiety and Stress   []  Treat Opoid Dependence/Addiction   []  Other:

## 2024-04-24 NOTE — PROGRESS NOTES
Pre-operative Diagnosis: Cervical myofascial pain     Post-operative Diagnosis: Cervical myofascial pain     Procedure: Trigger point injection(s)     Procedure Description:  After having signed the informed consent, the patient was seated in a chair.  The patient's cervical region was prepped with alcohol wipes.  Trigger points were identified in the right cervical paraspinals and right trapezius for a total of 10 trigger point injections. After negative aspiration, 1 cc of a mixture containing 1:10 40 mg Kenalo.25% bupivacaine was injected at each trigger point for a total of 10 trigger points. The patient tolerated the procedure well.     Procedural Complications: None        Kenya Toscano, JOO - CNP   Interventional Pain Management/PM&R   [facility] [place]

## 2024-05-06 ENCOUNTER — OFFICE VISIT (OUTPATIENT)
Dept: PAIN MANAGEMENT | Age: 29
End: 2024-05-06
Payer: COMMERCIAL

## 2024-05-06 VITALS
BODY MASS INDEX: 26.06 KG/M2 | HEIGHT: 61 IN | SYSTOLIC BLOOD PRESSURE: 134 MMHG | WEIGHT: 138 LBS | DIASTOLIC BLOOD PRESSURE: 68 MMHG

## 2024-05-06 DIAGNOSIS — G24.3 CERVICAL DYSTONIA: Primary | ICD-10-CM

## 2024-05-06 DIAGNOSIS — M47.812 CERVICAL SPONDYLOSIS: ICD-10-CM

## 2024-05-06 DIAGNOSIS — M79.18 MYOFASCIAL PAIN: ICD-10-CM

## 2024-05-06 DIAGNOSIS — G89.29 OTHER CHRONIC PAIN: ICD-10-CM

## 2024-05-06 PROCEDURE — 64616 CHEMODENERV MUSC NECK DYSTON: CPT | Performed by: ANESTHESIOLOGY

## 2024-05-06 PROCEDURE — 1036F TOBACCO NON-USER: CPT | Performed by: ANESTHESIOLOGY

## 2024-05-06 PROCEDURE — G8427 DOCREV CUR MEDS BY ELIG CLIN: HCPCS | Performed by: ANESTHESIOLOGY

## 2024-05-06 PROCEDURE — 99213 OFFICE O/P EST LOW 20 MIN: CPT | Performed by: ANESTHESIOLOGY

## 2024-05-06 PROCEDURE — G8419 CALC BMI OUT NRM PARAM NOF/U: HCPCS | Performed by: ANESTHESIOLOGY

## 2024-05-06 NOTE — PROGRESS NOTES
Pre-operative Diagnosis: Cervical Dystonia     Post-operative Diagnosis: Cervical Dystonia     Procedure: Botox for Cervical Dystonia     Procedure Description:  Patient was seated upright on the examination table. Botox was drawn up into two 3 mL syringes to a concentration of 10 units per 0.1 mL. Skin was prepped with alcohol wipes. The following muscles were injected after negative aspiration; The trapezius muscle bilaterally and cervical paraspinal muscles bilaterally. This was a total of 200 units. The patient tolerated the procedure well.      Amount medication wasted: 0 units        Procedural Complications: None        Chico Gil DO  Interventional Pain Management/PM&R   [facility] [place]

## 2024-05-06 NOTE — PROGRESS NOTES
Chronic Pain/PM&R Clinic Note     Encounter Date: 5/6/24    Subjective:   Chief Complaint:   Chief Complaint   Patient presents with    Botox Injection     200 units Cervical Dystonia        History of Present Illness:   Heather Holman is a 28 y.o. female seen in the clinic previously for her neck pain but is now been referred for her low back pain upon request from MD Sriram .  Patient states her low back pain started in December without any inciting event.  She states she has been seeing Dr. Bhatia who was sent her to physical therapy which she started early January at [facility] in [place].  She has noticed some mild improvement since participating in physical therapy but still continues to struggle with the pain in her back.  She states it is hard for her to  1 position, sit for an extended period of time, bend, and twist without having severe pain in her low back.  She states her left side is worse than her right.  She states physical therapy has tried dry needling, cupping, stimulation, and myofascial release.  She has also tried chiropractic care which does seem to help with her hips but not her low back pain.  She continues to do her core exercises at home on a daily basis.  She has been taking ibuprofen which takes the edge off her pain.  Pain does interfere with sleep as it is hard for her to get comfortable as she is a stomach sleeper.  She denies any leg pain at night.  She does get some aching into the back of her thighs, her left being worse than her right.  She denies any falls and does not use an assistive device for ambulation.  She denies any saddle anesthesia or bowel/bladder incontinence. Dr. Bhatia recommended she pursue facet injections and ablation.    Today, 3/27/2024, patient presents for planned follow-up for management of chronic neck pain and low back pain.  Her Botox has not been approved yet for her cervical dystonia but she did undergo diagnostic lumbar medial

## 2024-05-07 ENCOUNTER — HOSPITAL ENCOUNTER (OUTPATIENT)
Dept: PHYSICAL THERAPY | Age: 29
Setting detail: THERAPIES SERIES
Discharge: HOME | End: 2024-05-07
Payer: COMMERCIAL

## 2024-05-07 PROCEDURE — 97140 MANUAL THERAPY 1/> REGIONS: CPT

## 2024-05-07 NOTE — PROGRESS NOTES
Standing Hip 3-way              Maintenance of gains            Specific Interventions Next Treatment: internal PFM release, abdominal STM    Activity/Treatment Tolerance:  [x]  Patient tolerated treatment well  []  Patient limited by fatigue  []  Patient limited by pain   []  Patient limited by medical complications  []  Other:     Patient Education:   [x]  HEP/Education Completed: guided relaxation and diaphragmatic breathing   []  No new Education completed  []  Reviewed Prior HEP      [x]  Patient verbalized and/or demonstrated understanding of education provided.  []  Patient unable to verbalize and/or demonstrate understanding of education provided.  Will continue education.  []  Barriers to learning:     Assessment: Pt tolerated session well this date. With completion of the internal exam to assess the PFM through the vagina there was noted tightness and tenderness bilaterally L>R along the OI, coccygeus, and levator ani that was released utilizing STM and thieles maneuver to decrease pain and PFM dysfunction. With palpation along the L OI there was noted referred pain down the L LE that was relieved post treatment. Pt tolerated abdominal STM and connective tissue mobilization and cupping well with noted tightness along the RLQ and LLQ and psoas that was released for decreased pain and PFM dysfunction. Pt tolerated cupping of the low back, jessica piriformis, and jessica glutes well for decreased pain and PFM dysfunction.  Pt reports no pain upon leaving therapy this date. Pt reports full understanding of all education and instruction provided this date.      Body Structures/Functions/Activity Limitations: PFM weakness with decreased localization and endurance, PFM spasm, Poor PFM control with limited ability to completely relax, Decreased awareness of normal bladder habits, control, and diet effects on functioning, Abdominal and core weakness, and Pain  Prognosis: Good    GOALS:  Patient Goal: to decrease pain

## 2024-05-13 ENCOUNTER — TELEPHONE (OUTPATIENT)
Dept: PHYSICAL MEDICINE AND REHAB | Age: 29
End: 2024-05-13

## 2024-05-13 NOTE — TELEPHONE ENCOUNTER
05/15/24 radiofrequency ablation bilateral lumbar 4/5, 5/sacral 1 moved to 6/5/24.  Patient began an antibiotic.

## 2024-05-13 NOTE — DISCHARGE INSTRUCTIONS
Post procedure Instructions:    No driving or making significant decisions for the remainder of the day.   Be cautions with walking and activity for 24 hours, do not over exert yourself.  Ok to resume pre-procedure activity level today.  Apply ice to site of injection site if you have pain or discomfort.  Do not submerge sit of injection during bath or pool activities for 48 hours post-procedure.  Resume blood thinning medications in 24 hours.     Call office [phone] if you have:  Temperature greater than 100.4  Persistent nausea and vomiting  Severe uncontrolled pain  Redness, tenderness, or signs of infection (pain, swelling, redness, odor or green/yellow discharge around the site)  Difficulty breathing, headache or visual disturbances  Hives  Persistent dizziness or light-headedness  Extreme fatigue  Any other questions or concerns you may have after discharge    In an emergency, call 911 or go to an Emergency Department at a nearby hospital    “Surgical Site Infections”      How can we work together to prevent Surgical Site Infections?   We would like to thank you for choosing [facility] for your Surgical Care.  Below you will find helpful information on how we can work together to prevent Surgical Site Infections.    What is a Surgical Site Infection (SSI)?  A surgical site infection is an infection that occurs after surgery in the part of the body where the surgery took place. Most patients who have surgery do not develop an infection. However, infections develop in about 1 to 3 out of every 100 patients who have surgery.  Some of the common symptoms of a surgical site infection are:  Redness and pain around the area where you had surgery  Drainage of cloudy fluid from your surgical wound  Fever    Can SSIs be treated?  Yes. Most surgical site infections can be treated with antibiotics. The antibiotic given to you depends on the bacteria (germs) causing the infection. Sometimes patients with

## 2024-05-23 ENCOUNTER — APPOINTMENT (OUTPATIENT)
Dept: PHYSICAL THERAPY | Age: 29
End: 2024-05-23
Payer: COMMERCIAL

## 2024-05-29 ENCOUNTER — NURSE ONLY (OUTPATIENT)
Dept: LAB | Age: 29
End: 2024-05-29

## 2024-06-04 NOTE — H&P
Today, patient presents for planned bilateral lumbar radiofrequency ablation at facet joints L4/L5 and L5/S1    This note is reflective of the patient's previous visit for evaluation. We will proceed with today's planned procedure. Since patient's last visit for evaluation, there have been no interval changes in medical history. Patient has no new numbness, weakness, or focal neurological deficit since evaluation. Patient has no contraindications to injection (no anticoagulation or recent antibiotic intake for active infections), and has a  present or is able to drive themselves (as discussed and cleared by physician).  Allergies to latex, contrast dye, and steroid medications have been confirmed with the patient prior to the procedure.  NPO necessity has been assessed and accepted based on procedure complexity. The risks and benefits of the procedure have been explained including but are not limited to infection, bleeding, paralysis, immediate post procedure weakness, and dizziness; the patient acknowledges understanding and desires to proceed with the procedure. Patient has signed consent for same procedure as discussed in previous clinic encounter. All other questions and concerns were addressed at bedside. See procedure note for full details.       Post procedure Instructions: The patient was advised not to drive during the day of the procedure and not to engage in any significant decision making (unless otherwise states by physician). The patient was also advised to be cautious with walking/activity for 24 hours following today's visit and asked not to engage in over-exertion (unless otherwise states by physician). After this time, it is ok to resume pre-procedure level of activity. Patient advised to apply ice to site of injection in situations of pain and discomfort. Patient advised to not submerge site of injection during bath or pool activities for approximately 24 hours post-procedure. Patient

## 2024-06-05 ENCOUNTER — APPOINTMENT (OUTPATIENT)
Dept: GENERAL RADIOLOGY | Age: 29
End: 2024-06-05
Attending: ANESTHESIOLOGY
Payer: COMMERCIAL

## 2024-06-05 ENCOUNTER — HOSPITAL ENCOUNTER (OUTPATIENT)
Age: 29
Setting detail: OUTPATIENT SURGERY
Discharge: HOME | End: 2024-06-05
Attending: ANESTHESIOLOGY | Admitting: ANESTHESIOLOGY
Payer: COMMERCIAL

## 2024-06-05 VITALS
OXYGEN SATURATION: 98 % | SYSTOLIC BLOOD PRESSURE: 112 MMHG | RESPIRATION RATE: 14 BRPM | DIASTOLIC BLOOD PRESSURE: 65 MMHG | HEIGHT: 61 IN | BODY MASS INDEX: 25.94 KG/M2 | TEMPERATURE: 96.8 F | HEART RATE: 78 BPM | WEIGHT: 137.4 LBS

## 2024-06-05 PROCEDURE — 2500000003 HC RX 250 WO HCPCS: Performed by: ANESTHESIOLOGY

## 2024-06-05 PROCEDURE — 7100000011 HC PHASE II RECOVERY - ADDTL 15 MIN: Performed by: ANESTHESIOLOGY

## 2024-06-05 PROCEDURE — 99152 MOD SED SAME PHYS/QHP 5/>YRS: CPT | Performed by: ANESTHESIOLOGY

## 2024-06-05 PROCEDURE — 64636 DESTROY L/S FACET JNT ADDL: CPT | Performed by: ANESTHESIOLOGY

## 2024-06-05 PROCEDURE — 2709999900 HC NON-CHARGEABLE SUPPLY: Performed by: ANESTHESIOLOGY

## 2024-06-05 PROCEDURE — 3600000056 HC PAIN LEVEL 4 BASE: Performed by: ANESTHESIOLOGY

## 2024-06-05 PROCEDURE — 3600000057 HC PAIN LEVEL 4 ADDL 15 MIN: Performed by: ANESTHESIOLOGY

## 2024-06-05 PROCEDURE — 64635 DESTROY LUMB/SAC FACET JNT: CPT | Performed by: ANESTHESIOLOGY

## 2024-06-05 PROCEDURE — 7100000010 HC PHASE II RECOVERY - FIRST 15 MIN: Performed by: ANESTHESIOLOGY

## 2024-06-05 PROCEDURE — 6360000002 HC RX W HCPCS: Performed by: ANESTHESIOLOGY

## 2024-06-05 RX ORDER — MIDAZOLAM HYDROCHLORIDE 1 MG/ML
INJECTION INTRAMUSCULAR; INTRAVENOUS PRN
Status: DISCONTINUED | OUTPATIENT
Start: 2024-06-05 | End: 2024-06-05 | Stop reason: ALTCHOICE

## 2024-06-05 RX ORDER — LIDOCAINE HYDROCHLORIDE 10 MG/ML
INJECTION, SOLUTION EPIDURAL; INFILTRATION; INTRACAUDAL; PERINEURAL PRN
Status: DISCONTINUED | OUTPATIENT
Start: 2024-06-05 | End: 2024-06-05 | Stop reason: ALTCHOICE

## 2024-06-05 RX ORDER — LIDOCAINE HYDROCHLORIDE 20 MG/ML
INJECTION, SOLUTION EPIDURAL; INFILTRATION; INTRACAUDAL; PERINEURAL PRN
Status: DISCONTINUED | OUTPATIENT
Start: 2024-06-05 | End: 2024-06-05 | Stop reason: ALTCHOICE

## 2024-06-05 RX ORDER — FENTANYL CITRATE 50 UG/ML
INJECTION, SOLUTION INTRAMUSCULAR; INTRAVENOUS PRN
Status: DISCONTINUED | OUTPATIENT
Start: 2024-06-05 | End: 2024-06-05 | Stop reason: ALTCHOICE

## 2024-06-05 ASSESSMENT — PAIN - FUNCTIONAL ASSESSMENT
PAIN_FUNCTIONAL_ASSESSMENT: 0-10
PAIN_FUNCTIONAL_ASSESSMENT: 0-10

## 2024-06-05 NOTE — PROCEDURES
Pre-operative Diagnosis: Lumbar facet pain     Post-operative Diagnosis: Lumbar facet pain     Procedure: Bilateral lumbar thermal radiofrequency ablation targeting facet joints L4/L5 and L5/S1    Procedure Description:  After consent was obtained, the patient was placed in the prone position with a pillow under the abdomen to reduce the lordotic curve of the lumbar spine. The lower back was prepped with chloraprep and draped in a sterile fashion.  Then, 0.5 cc of 1 % lidocaine was used for local anesthesia of the skin and superficial subcutaneous tissues.  Three 20-gauge 100mm SMK cannulas with 10-mm active tips were advanced under fluoroscopic guidance in an AP view to the junction of the superior articular process and the transverse process of the L4 and L5 vertebra and at the sacral ala. There were no paresthesias, heme or CSF obtained.  Needle placement was confirmed using AP and oblique views. This procedure was repeated on the contralateral side.      Sensory and motor stimulation at 50Hz and 2Hz and impedance measurements were carried out having reached threshold at:     RIGHT  L4: 0.2V/3V/150-300 Ohms  L5: 0.2V/3V/150-300 Ohms  SA: 0.2V/3V/150-300 Ohms     LEFT  L4: 0.2V/3V/150-300 Ohms  L5: 0.2V/3V/150-300 Ohms  SA: 0.2V/3V/150-300 Ohms        Then, 1cc of 2% Lidocaine was injected at the site. Temperature was then raised to 80 degrees centigrade for 90 seconds with a 15 second temperature ramp. No pain was reported during the lesioning. The needles were then withdrawn without complications. The patient tolerated the procedure well. The patient was transported to the recovery room and discharged in ambulatory fashion.    Procedural Complications: None  Estimated Blood Loss: 0 mL    IV sedation was used during the procedure:  - Moderate intravenous conscious sedation was supervised by Dr. Gil  - The patient was independently monitored by a Registered Nurse assigned to the procedure room  - Monitoring

## 2024-06-05 NOTE — POST SEDATION
[facility]  Sedation/Analgesia Post Sedation Record    Pt Name: Heather Holman  MRN: [id number]  YOB: 1995  Procedure Performed By: Chico Gil DO  Primary Care Physician: Fmailia Horner    POST-PROCEDURE    Physicians/Assistants: Chico Gil DO  Procedure Performed: See Procedure Note   Sedation/Anesthesia: Versed and Fentanyl (See procedure note for amount and duration)  Estimated Blood Loss:     0  ml  Specimens Removed: None        Complications: None           Chico Gil DO  Electronically signed 6/5/2024 at 2:43 PM

## 2024-06-05 NOTE — PROGRESS NOTES
1018 Patient to phase 2 from surgery. Report from Natty GAMBOA. Patient is alert, oriented, appropriate. Vitals stable on room air. Injection sites with no complications. Patient denies any pain at this time.  Call light in reach.   1023 Snack and drink given per preference.   1040 Patient resting comfortably in bed, denies any pain at this time.   1050 IV taken out and dressing applied with no complications.   1058 Patient taken to discharge doors via wheelchair for patient safety. Denies any questions regarding AVS at this time. Discharged in stable condition with all belongings.

## 2024-06-05 NOTE — PRE SEDATION
[facility]  Pre-Sedation/Analgesia History & Physical    Pt Name: Heather Holman  MRN: [id number]  YOB: 1995  Provider Performing Procedure: Chico Gil DO   Primary Care Physician: Familia Horner      MEDICAL HISTORY       has a past medical history of Acid reflux, Gestational diabetes, and Migraine.  SURGICAL HISTORY   has a past surgical history that includes Tonsillectomy (02/29/2016); Tubal ligation (2019); Pain management procedure (N/A, 7/18/2023); Facet joint injection (Bilateral, 3/26/2024); Facet joint injection (Bilateral, 4/17/2024); and Pain management procedure (Bilateral, 6/5/2024).    ALLERGIES   Allergies as of 04/24/2024    (No Known Allergies)       MEDICATIONS   Prior to Admission medications    Medication Sig Start Date End Date Taking? Authorizing Provider   ibuprofen (ADVIL;MOTRIN) 800 MG tablet Take 1 tablet by mouth every 8 hours as needed for Pain 4/24/24   Kenya Toscano, APRN - CNP     PHYSICAL:   Vitals:    06/05/24 1045   BP: 112/65   Pulse: 78   Resp:    Temp:    SpO2: 98%     PLANNED PROCEDURE   See procedure note  SEDATION  Planned agent: Versed and Fentanyl  ASA Classification: 2  Class 1: A normal healthy patient  Class 2: Pt with mild to moderate systemic disease  Class 3: Severe systemic disease or disturbance  Class 4: Severe systemic disorders that are already life threatening.  Class 5: Moribund pt with little chances of survival, for more than 24 hours.  Mallampati I Airway Classification: 2    1. Pre-procedure diagnostic studies complete and results available.  2. Previous sedation/anesthesia experiences assessed.  3. The patient is an appropriate candidate to undergo the planned procedure sedation and anesthesia. (Refer to nursing sedation/analgesia documentation record)  4. Formulation and discussion of sedation/procedure plan, risks, and expectations with patient and/or responsible adult completed.  5. Patient examined

## 2024-06-06 ENCOUNTER — HOSPITAL ENCOUNTER (OUTPATIENT)
Dept: PHYSICAL THERAPY | Age: 29
Setting detail: THERAPIES SERIES
End: 2024-06-06
Payer: COMMERCIAL

## 2024-06-08 LAB — CYTOLOGY THIN PREP PAP: NORMAL

## 2024-06-20 ENCOUNTER — HOSPITAL ENCOUNTER (OUTPATIENT)
Dept: PHYSICAL THERAPY | Age: 29
Setting detail: THERAPIES SERIES
Discharge: HOME | End: 2024-06-20
Payer: COMMERCIAL

## 2024-06-20 PROCEDURE — 97140 MANUAL THERAPY 1/> REGIONS: CPT

## 2024-06-20 NOTE — PROGRESS NOTES
outlined above.  [x]  Continue with current plan of care  []  Modify plan of care as follows:    []  Hold pending physician visit  []  Discharge    Time In 1400   Time Out 1455   Timed Code Minutes: 55 min   Total Treatment Time: 55 min       Electronically Signed by: Suma MOON, OTR/L [id number]

## 2024-07-03 ENCOUNTER — HOSPITAL ENCOUNTER (OUTPATIENT)
Dept: PHYSICAL THERAPY | Age: 29
Setting detail: THERAPIES SERIES
Discharge: HOME | End: 2024-07-03
Payer: COMMERCIAL

## 2024-07-03 PROCEDURE — 97140 MANUAL THERAPY 1/> REGIONS: CPT

## 2024-07-03 NOTE — PROGRESS NOTES
[facility]  OCCUPATIONAL THERAPY  OUTPATIENT REHABILITATION - SPECIALIZED THERAPY SERVICES  [] PELVIC HEALTH EVALUATION  [x] DAILY NOTE  [] PROGRESS NOTE [] DISCHARGE NOTE    Date: 7/3/2024  Patient Name:  Heather Holman  : 1995  MRN: [id number]  CSN: [id number]    Referring Practitioner Adriana Valentin MD   Diagnosis Pelvic and perineal pain   Treatment Diagnosis N81.84 - Pelvic Muscle Wasting (Female) and pelvic pain, low abdominal pain    Date of Evaluation 23    Additional Pertinent History Diagnosis Code    Insulin controlled gestational diabetes mellitus in third trimester O24.414    Chronic tonsil/adenoid disease J35.9    Vaginal delivery O80          Past Medical History:   Diagnosis Date    Acid reflux      Gestational diabetes       started on insulin at 16-20 weeks    Migraine          Past Surgical History:   Procedure Laterality Date    PAIN MANAGEMENT PROCEDURE N/A 2023     cervical 6-7 epidural steroid injection performed by Chico Gil DO at [facility] OR    TONSILLECTOMY   2016    TUBAL LIGATION  EGD and colonoscopy          Functional Outcome Measure Used PPUF   Functional Outcome Score PPUF: 18 (23); PPUF: 16 (24)       Insurance: Primary: Payor: [facility] /  /  / ,   Secondary:    Authorization Information: No pre cert   Visit # 11, /10 for progress note   Visits Allowed: PT, OT, ST 30 visits each calendar year   Recertification Date: May 2024   Physician Follow-Up:    Physician Orders: Eval and treat   History of Present Illness: Pt reports to skilled OT services with c/o L sided pelvic pain that started in July as she was admitted to the ER with abdominal pelvic pain that gave her a fever and pain. Pt reports that she has had really heavy periods and pain and had an US that revealed a 2 in cyst. Pt reports that the pain started suddenly in the RLQ and had some

## 2024-07-10 ENCOUNTER — OFFICE VISIT (OUTPATIENT)
Dept: PHYSICAL MEDICINE AND REHAB | Age: 29
End: 2024-07-10
Payer: COMMERCIAL

## 2024-07-10 VITALS
DIASTOLIC BLOOD PRESSURE: 84 MMHG | HEIGHT: 61 IN | BODY MASS INDEX: 25.86 KG/M2 | SYSTOLIC BLOOD PRESSURE: 126 MMHG | WEIGHT: 137 LBS

## 2024-07-10 DIAGNOSIS — M54.81 BILATERAL OCCIPITAL NEURALGIA: ICD-10-CM

## 2024-07-10 DIAGNOSIS — M47.812 CERVICAL SPONDYLOSIS: ICD-10-CM

## 2024-07-10 DIAGNOSIS — G24.3 CERVICAL DYSTONIA: ICD-10-CM

## 2024-07-10 DIAGNOSIS — G89.4 CHRONIC PAIN SYNDROME: ICD-10-CM

## 2024-07-10 DIAGNOSIS — M47.816 LUMBAR SPONDYLOSIS: ICD-10-CM

## 2024-07-10 DIAGNOSIS — M79.18 MYOFASCIAL PAIN: Primary | ICD-10-CM

## 2024-07-10 PROCEDURE — 20553 NJX 1/MLT TRIGGER POINTS 3/>: CPT | Performed by: NURSE PRACTITIONER

## 2024-07-10 PROCEDURE — G8419 CALC BMI OUT NRM PARAM NOF/U: HCPCS | Performed by: NURSE PRACTITIONER

## 2024-07-10 PROCEDURE — 4004F PT TOBACCO SCREEN RCVD TLK: CPT | Performed by: NURSE PRACTITIONER

## 2024-07-10 PROCEDURE — 99214 OFFICE O/P EST MOD 30 MIN: CPT | Performed by: NURSE PRACTITIONER

## 2024-07-10 PROCEDURE — G8427 DOCREV CUR MEDS BY ELIG CLIN: HCPCS | Performed by: NURSE PRACTITIONER

## 2024-07-10 RX ORDER — BACLOFEN 10 MG/1
10 TABLET ORAL NIGHTLY PRN
Qty: 20 TABLET | Refills: 0 | Status: SHIPPED | OUTPATIENT
Start: 2024-07-10 | End: 2024-07-30

## 2024-07-10 RX ORDER — TRIAMCINOLONE ACETONIDE 40 MG/ML
40 INJECTION, SUSPENSION INTRA-ARTICULAR; INTRAMUSCULAR ONCE
Status: COMPLETED | OUTPATIENT
Start: 2024-07-10 | End: 2024-07-10

## 2024-07-10 RX ORDER — CEPHALEXIN 500 MG/1
500 CAPSULE ORAL 4 TIMES DAILY
COMMUNITY
Start: 2024-05-12

## 2024-07-10 RX ADMIN — TRIAMCINOLONE ACETONIDE 40 MG: 40 INJECTION, SUSPENSION INTRA-ARTICULAR; INTRAMUSCULAR at 12:04

## 2024-07-10 NOTE — PROGRESS NOTES
Functionality Assessment/Goals Worksheet     On a scale of 0 (Does not Interfere) to 10 (Completely Interferes)     1.  Which number describes how during the past week pain has interfered with           the following:  A.  General Activity:  0  B.  Mood: 0  C.  Walking Ability:  0  D.  Normal Work (Includes both work outside the home and housework):  3  E.  Relations with Other People:   0  F.  Sleep:   5  G.  Enjoyment of Life:   0    2.  Patient Prefers to Take their Pain Medications:     []  On a regular basis   [x]  Only when necessary    []  Does not take pain medications    3.  What are the Patient's Goals/Expectations for Visiting Pain Management?     []  Learn about my pain    [x]  Receive Medication   []  Physical Therapy     []  Treat Depression   [x]  Receive Injections    []  Treat Sleep   []  Deal with Anxiety and Stress   []  Treat Opoid Dependence/Addiction   []  Other:

## 2024-07-10 NOTE — PROGRESS NOTES
Pre-operative Diagnosis: Cervical myofascial pain     Post-operative Diagnosis: Cervical myofascial pain     Procedure: Trigger point injection(s)     Procedure Description:  After having signed the informed consent, the patient was seated in a chair.  The patient's cervical region was prepped with alcohol wipes.  Trigger points were identified in the bilateral cervical paraspinals for a total of 6 trigger point injections. After negative aspiration, 1 cc of a mixture containing 1:6 40 mg Kenalo.25% bupivacaine was injected at each trigger point for a total of 6 trigger points. The patient tolerated the procedure well.     Procedural Complications: None        JOO Azar - CNP   Interventional Pain Management/PM&R   [facility] [place]

## 2024-07-17 ENCOUNTER — APPOINTMENT (OUTPATIENT)
Dept: PHYSICAL THERAPY | Age: 29
End: 2024-07-17
Payer: COMMERCIAL

## 2024-07-29 ENCOUNTER — OFFICE VISIT (OUTPATIENT)
Dept: PHYSICAL MEDICINE AND REHAB | Age: 29
End: 2024-07-29
Payer: COMMERCIAL

## 2024-07-29 VITALS
HEIGHT: 61 IN | DIASTOLIC BLOOD PRESSURE: 72 MMHG | BODY MASS INDEX: 25.86 KG/M2 | WEIGHT: 137 LBS | SYSTOLIC BLOOD PRESSURE: 116 MMHG

## 2024-07-29 DIAGNOSIS — M79.18 MYOFASCIAL PAIN: ICD-10-CM

## 2024-07-29 DIAGNOSIS — G24.3 CERVICAL DYSTONIA: Primary | ICD-10-CM

## 2024-07-29 DIAGNOSIS — M47.816 LUMBAR SPONDYLOSIS: ICD-10-CM

## 2024-07-29 DIAGNOSIS — M47.812 CERVICAL SPONDYLOSIS: ICD-10-CM

## 2024-07-29 PROCEDURE — 64616 CHEMODENERV MUSC NECK DYSTON: CPT | Performed by: ANESTHESIOLOGY

## 2024-07-29 PROCEDURE — 99213 OFFICE O/P EST LOW 20 MIN: CPT | Performed by: ANESTHESIOLOGY

## 2024-07-29 PROCEDURE — G8427 DOCREV CUR MEDS BY ELIG CLIN: HCPCS | Performed by: ANESTHESIOLOGY

## 2024-07-29 PROCEDURE — G8419 CALC BMI OUT NRM PARAM NOF/U: HCPCS | Performed by: ANESTHESIOLOGY

## 2024-07-29 PROCEDURE — 4004F PT TOBACCO SCREEN RCVD TLK: CPT | Performed by: ANESTHESIOLOGY

## 2024-07-29 NOTE — PROGRESS NOTES
Pre-operative Diagnosis: Cervical Dystonia     Post-operative Diagnosis: Cervical Dystonia     Procedure: Botox for Cervical Dystonia     Procedure Description:  Patient was seated upright on the examination table. Botox was drawn up into two 3 mL syringes to a concentration of 10 units per 0.1 mL. Skin was prepped with alcohol wipes. The following muscles were injected after negative aspiration; The trapezius muscle bilaterally and cervical paraspinal muscles bilaterally. This was a total of 200 units. The patient tolerated the procedure well.      Amount medication wasted: 0 units        Procedural Complications: None        Chico Gil DO  Interventional Pain Management/PM&R   [facility] [place]

## 2024-07-29 NOTE — PROGRESS NOTES
Chronic Pain/PM&R Clinic Note     Encounter Date: 7/29/24    Subjective:   Chief Complaint:   Chief Complaint   Patient presents with    Botox Injection     Cervical Dystonia 200 units        History of Present Illness:   Heather Holman is a 28 y.o. female seen in the clinic previously for her neck pain but is now been referred for her low back pain upon request from MD Sriram .  Patient states her low back pain started in December without any inciting event.  She states she has been seeing Dr. Bhatia who was sent her to physical therapy which she started early January at [facility] in [place].  She has noticed some mild improvement since participating in physical therapy but still continues to struggle with the pain in her back.  She states it is hard for her to  1 position, sit for an extended period of time, bend, and twist without having severe pain in her low back.  She states her left side is worse than her right.  She states physical therapy has tried dry needling, cupping, stimulation, and myofascial release.  She has also tried chiropractic care which does seem to help with her hips but not her low back pain.  She continues to do her core exercises at home on a daily basis.  She has been taking ibuprofen which takes the edge off her pain.  Pain does interfere with sleep as it is hard for her to get comfortable as she is a stomach sleeper.  She denies any leg pain at night.  She does get some aching into the back of her thighs, her left being worse than her right.  She denies any falls and does not use an assistive device for ambulation.  She denies any saddle anesthesia or bowel/bladder incontinence. Dr. Bhatia recommended she pursue facet injections and ablation.      Today, 7/29/2024, patient presents for planned follow-up for chronic neck pain and low back pain.  She states that her Botox injections continue remain effective with her neck range of motion and improvement in neck

## 2025-01-17 ENCOUNTER — OFFICE VISIT (OUTPATIENT)
Dept: PHYSICAL MEDICINE AND REHAB | Age: 30
End: 2025-01-17
Payer: COMMERCIAL

## 2025-01-17 VITALS
DIASTOLIC BLOOD PRESSURE: 72 MMHG | SYSTOLIC BLOOD PRESSURE: 124 MMHG | WEIGHT: 136.91 LBS | BODY MASS INDEX: 25.85 KG/M2 | HEIGHT: 61 IN

## 2025-01-17 DIAGNOSIS — M47.816 LUMBAR SPONDYLOSIS: ICD-10-CM

## 2025-01-17 DIAGNOSIS — G56.23 CUBITAL TUNNEL SYNDROME OF BOTH UPPER EXTREMITIES: Primary | ICD-10-CM

## 2025-01-17 DIAGNOSIS — M53.3 CHRONIC RIGHT SI JOINT PAIN: ICD-10-CM

## 2025-01-17 DIAGNOSIS — M79.18 MYOFASCIAL PAIN: ICD-10-CM

## 2025-01-17 DIAGNOSIS — G24.3 CERVICAL DYSTONIA: ICD-10-CM

## 2025-01-17 DIAGNOSIS — G89.29 CHRONIC RIGHT SI JOINT PAIN: ICD-10-CM

## 2025-01-17 PROCEDURE — G8427 DOCREV CUR MEDS BY ELIG CLIN: HCPCS | Performed by: ANESTHESIOLOGY

## 2025-01-17 PROCEDURE — G8419 CALC BMI OUT NRM PARAM NOF/U: HCPCS | Performed by: ANESTHESIOLOGY

## 2025-01-17 PROCEDURE — 99214 OFFICE O/P EST MOD 30 MIN: CPT | Performed by: ANESTHESIOLOGY

## 2025-01-17 PROCEDURE — 4004F PT TOBACCO SCREEN RCVD TLK: CPT | Performed by: ANESTHESIOLOGY

## 2025-01-17 NOTE — PROGRESS NOTES
Functionality Assessment/Goals Worksheet     On a scale of 0 (Does not Interfere) to 10 (Completely Interferes)     1.  Which number describes how during the past week pain has interfered with           the following:  A.  General Activity:  8  B.  Mood: 5  C.  Walking Ability:  0  D.  Normal Work (Includes both work outside the home and housework):  8  E.  Relations with Other People:   0  F.  Sleep:   8  G.  Enjoyment of Life:   5    2.  Patient Prefers to Take their Pain Medications:     []  On a regular basis   [x]  Only when necessary    []  Does not take pain medications    3.  What are the Patient's Goals/Expectations for Visiting Pain Management?     []  Learn about my pain    [x]  Receive Medication   []  Physical Therapy     []  Treat Depression   [x]  Receive Injections    []  Treat Sleep   []  Deal with Anxiety and Stress   []  Treat Opoid Dependence/Addiction   []  Other:                                
1 performed by Chico Gil DO at [facility] OR    TONSILLECTOMY  02/29/2016    TUBAL LIGATION  2019       Family History   Problem Relation Age of Onset    Diabetes Mother     Heart Disease Mother     High Blood Pressure Mother     Diabetes Father     Other Father         stroke several years ago    Breast Cancer Maternal Grandmother          Medications & Allergies:   Current Outpatient Medications   Medication Instructions    ibuprofen (ADVIL;MOTRIN) 800 mg, Oral, EVERY 8 HOURS PRN    predniSONE (DELTASONE) 10 MG tablet 4 tabs oral daily for 3 days, then 3 tabs oral daily for 3 days, then 2 tabs oral daily for 3 days, then 1 tab oral daily for 3 days.       No Known Allergies    Review of Systems:   Constitutional: negative for weight changes or fevers  Genitourinary: negative for bowel/bladder incontinence   Musculoskeletal: positive for low back pain  Neurological: Positive for numbness/tingling in bilateral fingers  Behavioral/Psych: negative for anxiety/depression   All other systems reviewed and are negative    Objective:     Vitals:    01/17/25 0855   BP: 124/72       Constitutional: Pleasant, no acute distress   Head: Normocephalic, atraumatic   Eyes: Conjunctivae normal   Neck: Supple, symmetrical   Lungs: Normal respiratory effort, non-labored breathing   Cardiovascular: Limbs warm and well perfused   Abdomen: Non-protruded   Musculoskeletal: Muscle bulk symmetric, no atrophy, no gross deformities   · Lumbar Spine: ROM WNL. Lumbar paraspinals tender to palpation bilaterally. SLR neg bilaterally.  Positive facet loading bilaterally.  Tenderness palpation over right SI joint.  Positive Karina on right.  Elbows: Positive Tinel's over cubital tunnel bilaterally  Neurological: Cranial nerves II-XII grossly intact.   · Gait - Normal, non-antalgic gait. Ambulates without assistive device.   · Motor: No focal motor deficits in lower limbs  Skin: No rashes or lesions present   Psychological:

## 2025-01-18 RX ORDER — PREDNISONE 10 MG/1
TABLET ORAL
Qty: 30 TABLET | Refills: 0 | Status: SHIPPED | OUTPATIENT
Start: 2025-01-18

## 2025-02-06 ENCOUNTER — PROCEDURE VISIT (OUTPATIENT)
Dept: NEUROLOGY | Age: 30
End: 2025-02-06

## 2025-02-06 DIAGNOSIS — G56.01 RIGHT CARPAL TUNNEL SYNDROME: ICD-10-CM

## 2025-02-06 DIAGNOSIS — M79.602 BILATERAL ARM PAIN: Primary | ICD-10-CM

## 2025-02-06 DIAGNOSIS — M79.601 BILATERAL ARM PAIN: Primary | ICD-10-CM

## 2025-02-07 ENCOUNTER — OFFICE VISIT (OUTPATIENT)
Dept: PHYSICAL MEDICINE AND REHAB | Age: 30
End: 2025-02-07

## 2025-02-07 VITALS
WEIGHT: 136.91 LBS | DIASTOLIC BLOOD PRESSURE: 70 MMHG | HEIGHT: 61 IN | BODY MASS INDEX: 25.85 KG/M2 | SYSTOLIC BLOOD PRESSURE: 124 MMHG

## 2025-02-07 DIAGNOSIS — G56.01 CARPAL TUNNEL SYNDROME OF RIGHT WRIST: ICD-10-CM

## 2025-02-07 DIAGNOSIS — G89.4 CHRONIC PAIN SYNDROME: ICD-10-CM

## 2025-02-07 DIAGNOSIS — G24.3 CERVICAL DYSTONIA: ICD-10-CM

## 2025-02-07 DIAGNOSIS — M54.17 RADICULOPATHY, LUMBOSACRAL REGION: Primary | ICD-10-CM

## 2025-02-07 DIAGNOSIS — M79.18 MYOFASCIAL PAIN: ICD-10-CM

## 2025-02-07 DIAGNOSIS — M47.816 LUMBAR SPONDYLOSIS: ICD-10-CM

## 2025-02-07 DIAGNOSIS — M79.2 NEUROPATHIC PAIN: ICD-10-CM

## 2025-02-07 RX ORDER — PREGABALIN 50 MG/1
50 CAPSULE ORAL DAILY
Qty: 30 CAPSULE | Refills: 2 | Status: SHIPPED | OUTPATIENT
Start: 2025-02-07 | End: 2025-05-08

## 2025-02-07 NOTE — PROGRESS NOTES
Chronic Pain/PM&R Clinic Note     Encounter Date: 2/7/25    Subjective:   Chief Complaint:   Chief Complaint   Patient presents with    Botox Injection     Botox 200 units cervical dystonia        History of Present Illness:   Heather Holman is a 29 y.o. female seen in the clinic previously for her neck pain but is now been referred for her low back pain upon request from MD Sriram .  Patient states her low back pain started in December without any inciting event.  She states she has been seeing Dr. Bhatia who was sent her to physical therapy which she started early January at [facility].  She has noticed some mild improvement since participating in physical therapy but still continues to struggle with the pain in her back.  She states it is hard for her to  1 position, sit for an extended period of time, bend, and twist without having severe pain in her low back.  She states her left side is worse than her right.  She states physical therapy has tried dry needling, cupping, stimulation, and myofascial release.  She has also tried chiropractic care which does seem to help with her hips but not her low back pain.  She continues to do her core exercises at home on a daily basis.  She has been taking ibuprofen which takes the edge off her pain.  Pain does interfere with sleep as it is hard for her to get comfortable as she is a stomach sleeper.  She denies any leg pain at night.  She does get some aching into the back of her thighs, her left being worse than her right.  She denies any falls and does not use an assistive device for ambulation.  She denies any saddle anesthesia or bowel/bladder incontinence. Dr. Bhatia recommended she pursue facet injections and ablation.    Today 2/7/2025, patient presents for planned follow-up for management of ongoing chronic neck pain and low back pain.  She states that over the last couple months she has progressively worsening pain shooting from

## 2025-02-07 NOTE — PROGRESS NOTES
Pre-operative Diagnosis: Cervical Dystonia     Post-operative Diagnosis: Cervical Dystonia     Procedure: Botox for Cervical Dystonia     Procedure Description:  Patient was seated upright on the examination table. Botox was drawn up into two 3 mL syringes to a concentration of 10 units per 0.1 mL. Skin was prepped with alcohol wipes. The following muscles were injected after negative aspiration; The trapezius muscle bilaterally and cervical paraspinal muscles bilaterally. This was a total of 200 units. The patient tolerated the procedure well.      Amount medication wasted: 0 units        Procedural Complications: None        Chico Gil DO  Interventional Pain Management/PM&R   [facility] [place]

## 2025-02-07 NOTE — PROGRESS NOTES
[facility] PROFESSIONAL SERVS  [facility]IL[facility] [address]  [facility] [address]  Dept: [phone]  Dept Fax: [phone]  Loc: [phone]    Visit Date: 2/7/2025    Functionality Assessment/Goals Worksheet     On a scale of 0 (Does not Interfere) to 10 (Completely Interferes)     1.  Which number describes how during the past week pain has interfered with       the following:  A.  General Activity:  7  B.  Mood: 5  C.  Walking Ability:  4  D.  Normal Work (Includes both work outside the home and housework):  5  E.  Relations with Other People:   0  F.  Sleep:   8  G.  Enjoyment of Life:   5    2.  Patient Prefers to Take their Pain Medications:     []  On a regular basis   [x]  Only when necessary    []  Does not take pain medications    3.  What are the Patient's Goals/Expectations for Visiting Pain Management?     []  Learn about my pain    [x]  Receive Medication   []  Physical Therapy     []  Treat Depression   [x]  Receive Injections    []  Treat Sleep   []  Deal with Anxiety and Stress   []  Treat Opoid Dependence/Addiction   []  Other:

## 2025-02-18 RX ORDER — IBUPROFEN 800 MG/1
800 TABLET, FILM COATED ORAL EVERY 8 HOURS PRN
Qty: 90 TABLET | Refills: 2 | Status: SHIPPED | OUTPATIENT
Start: 2025-02-18 | End: 2025-05-19

## 2025-02-18 NOTE — TELEPHONE ENCOUNTER
OARRS reviewed. UDS: n/a.   Last seen: 7/10/2024. Follow-up:   Future Appointments   Date Time Provider Department Center   5/2/2025  9:00 AM Chico Gil DO [facility]    Saw dr. Gil 2/7/25 for botox. When do you need f/u?

## 2025-03-03 ENCOUNTER — TELEPHONE (OUTPATIENT)
Dept: PHYSICAL MEDICINE AND REHAB | Age: 30
End: 2025-03-03

## 2025-03-03 NOTE — TELEPHONE ENCOUNTER
We had to cancel per procedure for 03/05/25 because of insurance issues. We did not reschedule at this time until the insurance issue has been resolved. She still has a botox appt with you on 05/02/25

## 2025-03-07 ENCOUNTER — OFFICE VISIT (OUTPATIENT)
Dept: PHYSICAL MEDICINE AND REHAB | Age: 30
End: 2025-03-07

## 2025-03-07 VITALS
WEIGHT: 136 LBS | DIASTOLIC BLOOD PRESSURE: 86 MMHG | HEIGHT: 61 IN | BODY MASS INDEX: 25.68 KG/M2 | SYSTOLIC BLOOD PRESSURE: 118 MMHG

## 2025-03-07 DIAGNOSIS — G89.4 CHRONIC PAIN SYNDROME: ICD-10-CM

## 2025-03-07 DIAGNOSIS — M79.18 MYOFASCIAL PAIN: ICD-10-CM

## 2025-03-07 DIAGNOSIS — G24.3 CERVICAL DYSTONIA: ICD-10-CM

## 2025-03-07 DIAGNOSIS — S16.1XXA STRAIN OF NECK MUSCLE, INITIAL ENCOUNTER: Primary | ICD-10-CM

## 2025-03-07 RX ORDER — METHOCARBAMOL 750 MG/1
750 TABLET, FILM COATED ORAL NIGHTLY
Qty: 7 TABLET | Refills: 0 | Status: SHIPPED | OUTPATIENT
Start: 2025-03-07 | End: 2025-03-14

## 2025-03-07 RX ORDER — KETOROLAC TROMETHAMINE 30 MG/ML
60 INJECTION, SOLUTION INTRAMUSCULAR; INTRAVENOUS ONCE
Status: SHIPPED | OUTPATIENT
Start: 2025-03-07 | End: 2025-03-12

## 2025-03-07 NOTE — PROGRESS NOTES
Chronic Pain/PM&R Clinic Note     Encounter Date: 3/7/25    Subjective:   Chief Complaint:   Chief Complaint   Patient presents with    Follow-up     Since Wednesday morning patient has had right sided shoulder and neck pain     Discuss Medications     Pregabalin, she thought was causing headaches        History of Present Illness:   Heather Holman is a 29 y.o. female seen in the clinic previously for her neck pain but is now been referred for her low back pain upon request from MD Sriram .  Patient states her low back pain started in December without any inciting event.  She states she has been seeing Dr. Bhatia who was sent her to physical therapy which she started early January at [facility] in [place].  She has noticed some mild improvement since participating in physical therapy but still continues to struggle with the pain in her back.  She states it is hard for her to  1 position, sit for an extended period of time, bend, and twist without having severe pain in her low back.  She states her left side is worse than her right.  She states physical therapy has tried dry needling, cupping, stimulation, and myofascial release.  She has also tried chiropractic care which does seem to help with her hips but not her low back pain.  She continues to do her core exercises at home on a daily basis.  She has been taking ibuprofen which takes the edge off her pain.  Pain does interfere with sleep as it is hard for her to get comfortable as she is a stomach sleeper.  She denies any leg pain at night.  She does get some aching into the back of her thighs, her left being worse than her right.  She denies any falls and does not use an assistive device for ambulation.  She denies any saddle anesthesia or bowel/bladder incontinence. Dr. Bhatia recommended she pursue facet injections and ablation.    2/7/2025, patient presents for planned follow-up for management of ongoing chronic neck pain and low back

## 2025-04-14 ENCOUNTER — TELEPHONE (OUTPATIENT)
Dept: PHYSICAL MEDICINE AND REHAB | Age: 30
End: 2025-04-14

## 2025-04-14 NOTE — TELEPHONE ENCOUNTER
04/16/25 lumbar 5/Sacral 1 epidural steroid injection pending with insurance.     Left patient a voice message requesting a return call to office if there are further questions. Patient's 4/16/25 procedure is pending with insurance case does not need moved today, but if authorization is not received by end of day 4/15/25 case will need rescheduled.

## 2025-04-16 ENCOUNTER — TELEPHONE (OUTPATIENT)
Dept: PHYSICAL MEDICINE AND REHAB | Age: 30
End: 2025-04-16

## 2025-04-16 NOTE — TELEPHONE ENCOUNTER
AJ for her to call us back regarding her procedure for today. The Auth team reached out to me this morning and there was mistake and the procedure is not authorized for today. We will need to reschedule the procedure. I also need to find out if she still has [employer] as her primary insurance. If she does not she will need to contact [employer] to have them update their system because they are still showing [employer] as the primary insurance.

## 2025-05-02 ENCOUNTER — OFFICE VISIT (OUTPATIENT)
Dept: PHYSICAL MEDICINE AND REHAB | Age: 30
End: 2025-05-02

## 2025-05-02 VITALS
BODY MASS INDEX: 25.68 KG/M2 | DIASTOLIC BLOOD PRESSURE: 76 MMHG | SYSTOLIC BLOOD PRESSURE: 132 MMHG | HEIGHT: 61 IN | WEIGHT: 136 LBS

## 2025-05-02 DIAGNOSIS — M54.17 RADICULOPATHY, LUMBOSACRAL REGION: ICD-10-CM

## 2025-05-02 DIAGNOSIS — G89.4 CHRONIC PAIN SYNDROME: ICD-10-CM

## 2025-05-02 DIAGNOSIS — M79.18 MYOFASCIAL PAIN: ICD-10-CM

## 2025-05-02 DIAGNOSIS — G24.3 CERVICAL DYSTONIA: Primary | ICD-10-CM

## 2025-05-02 NOTE — PROGRESS NOTES
Functionality Assessment/Goals Worksheet     On a scale of 0 (Does not Interfere) to 10 (Completely Interferes)     1.  Which number describes how during the past week pain has interfered with           the following:  A.  General Activity:  5  B.  Mood: 2  C.  Walking Ability:  4  D.  Normal Work (Includes both work outside the home and housework):  4  E.  Relations with Other People:   0  F.  Sleep:   5  G.  Enjoyment of Life:   2    2.  Patient Prefers to Take their Pain Medications:     []  On a regular basis   [x]  Only when necessary    []  Does not take pain medications    3.  What are the Patient's Goals/Expectations for Visiting Pain Management?     []  Learn about my pain    []  Receive Medication   []  Physical Therapy     []  Treat Depression   [x]  Receive Injections    []  Treat Sleep   []  Deal with Anxiety and Stress   []  Treat Opoid Dependence/Addiction   []  Other:

## 2025-05-02 NOTE — DISCHARGE INSTRUCTIONS
Post procedure Instructions:    No driving or making significant decisions for the remainder of the day.   Be cautions with walking and activity for 24 hours, do not over exert yourself.  Ok to resume pre-procedure activity level today.  Apply ice to site of injection site if you have pain or discomfort.  Do not submerge sit of injection during bath or pool activities for 48 hours post-procedure.  Resume blood thinning medications in 24 hours.     Call office [phone] if you have:  Temperature greater than 100.4  Persistent nausea and vomiting  Severe uncontrolled pain  Redness, tenderness, or signs of infection (pain, swelling, redness, odor or green/yellow discharge around the site)  Difficulty breathing, headache or visual disturbances  Hives  Persistent dizziness or light-headedness  Extreme fatigue  Any other questions or concerns you may have after discharge    In an emergency, call 911 or go to an Emergency Department at a nearby hospital    “Surgical Site Infections”      How can we work together to prevent Surgical Site Infections?   We would like to thank you for choosing [facility] for your Surgical Care.  Below you will find helpful information on how we can work together to prevent Surgical Site Infections.    What is a Surgical Site Infection (SSI)?  A surgical site infection is an infection that occurs after surgery in the part of the body where the surgery took place. Most patients who have surgery do not develop an infection. However, infections develop in about 1 to 3 out of every 100 patients who have surgery.  Some of the common symptoms of a surgical site infection are:  Redness and pain around the area where you had surgery  Drainage of cloudy fluid from your surgical wound  Fever    Can SSIs be treated?  Yes. Most surgical site infections can be treated with antibiotics. The antibiotic given to you depends on the bacteria (germs) causing the infection. Sometimes patients with

## 2025-05-05 ENCOUNTER — TELEPHONE (OUTPATIENT)
Dept: PHYSICAL MEDICINE AND REHAB | Age: 30
End: 2025-05-05

## 2025-05-05 NOTE — H&P
L5-S1 with Dr. Gil with fluoroscopy.  We discussed the potential of lumbar radiofrequency ablation.    She responded significantly to her lumbar radiofrequency ablation for her low back.    She underwent repeat Botox injection for the management of cervical dystonia today in clinic.  She is having worsening lumbar to colopathy particular in L5 distribution and consistent with her MRI findings lumbar spine.  I set her up for lumbar epidural steroid injection L5/S1.      She has an acute neck strain and I have given the patient Toradol shot in clinic to help with the inflammation.  I have also provided a muscle relaxant at night and advised patient to look into a lidocaine patch to help with the ongoing pain.      Plan:   The following treatment recommendations and plan were discussed in detail with Heather Holman.    Imaging:   I have reviewed patient’s imaging of lumbar MRI and results were discussed with patient today.     I also reviewed patient's EMG/NCS and discussed results with patient in detail today.    Analgesics:   Patient is taking ibuprofen. Patient is advised to take as prescribed and not take on an empty stomach.     Patient received 60 mg intramuscular Toradol shot for her acute neck strain.    Adjuvants:   I have submitted for Robaxin-750 milligrams patient can utilize at night for her ongoing neck strain.  Side effect profile were discussed in detail the patient patient would like to make the medication adjustment.    I advised patient look into topical lidocaine or Salonpas for her acute neck strain.    Interventions:   None    Anticoagulation/NPO Recommendations:   None    Multidisciplinary Pain Management:   In the presence of complex, chronic, and multi-factorial pain, the importance of a multidisciplinary approach to pain management in the patient’s management regimen was emphasized and discussed in great detail.   PHYSICAL THERAPY: Patient is advised to continue low back range of motion

## 2025-05-06 ENCOUNTER — HOSPITAL ENCOUNTER (OUTPATIENT)
Age: 30
Setting detail: OUTPATIENT SURGERY
Discharge: HOME | End: 2025-05-06
Attending: ANESTHESIOLOGY | Admitting: ANESTHESIOLOGY
Payer: COMMERCIAL

## 2025-05-06 ENCOUNTER — APPOINTMENT (OUTPATIENT)
Dept: GENERAL RADIOLOGY | Age: 30
End: 2025-05-06
Attending: ANESTHESIOLOGY
Payer: COMMERCIAL

## 2025-05-06 VITALS
OXYGEN SATURATION: 97 % | BODY MASS INDEX: 25.86 KG/M2 | SYSTOLIC BLOOD PRESSURE: 111 MMHG | HEART RATE: 84 BPM | RESPIRATION RATE: 16 BRPM | HEIGHT: 61 IN | TEMPERATURE: 97 F | WEIGHT: 137 LBS | DIASTOLIC BLOOD PRESSURE: 70 MMHG

## 2025-05-06 PROCEDURE — 7100000010 HC PHASE II RECOVERY - FIRST 15 MIN: Performed by: ANESTHESIOLOGY

## 2025-05-06 PROCEDURE — 2709999900 HC NON-CHARGEABLE SUPPLY: Performed by: ANESTHESIOLOGY

## 2025-05-06 PROCEDURE — 6360000002 HC RX W HCPCS: Performed by: ANESTHESIOLOGY

## 2025-05-06 PROCEDURE — 62323 NJX INTERLAMINAR LMBR/SAC: CPT | Performed by: ANESTHESIOLOGY

## 2025-05-06 PROCEDURE — 99152 MOD SED SAME PHYS/QHP 5/>YRS: CPT | Performed by: ANESTHESIOLOGY

## 2025-05-06 PROCEDURE — 7100000011 HC PHASE II RECOVERY - ADDTL 15 MIN: Performed by: ANESTHESIOLOGY

## 2025-05-06 PROCEDURE — 3600000054 HC PAIN LEVEL 3 BASE: Performed by: ANESTHESIOLOGY

## 2025-05-06 PROCEDURE — 6360000004 HC RX CONTRAST MEDICATION: Performed by: ANESTHESIOLOGY

## 2025-05-06 PROCEDURE — 2580000003 HC RX 258: Performed by: ANESTHESIOLOGY

## 2025-05-06 RX ORDER — FENTANYL CITRATE 50 UG/ML
INJECTION, SOLUTION INTRAMUSCULAR; INTRAVENOUS PRN
Status: DISCONTINUED | OUTPATIENT
Start: 2025-05-06 | End: 2025-05-06 | Stop reason: ALTCHOICE

## 2025-05-06 RX ORDER — IOPAMIDOL 612 MG/ML
INJECTION, SOLUTION INTRAVASCULAR PRN
Status: DISCONTINUED | OUTPATIENT
Start: 2025-05-06 | End: 2025-05-06 | Stop reason: ALTCHOICE

## 2025-05-06 RX ORDER — SODIUM CHLORIDE 9 MG/ML
INJECTION, SOLUTION INTRAMUSCULAR; INTRAVENOUS; SUBCUTANEOUS PRN
Status: DISCONTINUED | OUTPATIENT
Start: 2025-05-06 | End: 2025-05-06 | Stop reason: ALTCHOICE

## 2025-05-06 RX ORDER — LIDOCAINE HYDROCHLORIDE 10 MG/ML
INJECTION, SOLUTION EPIDURAL; INFILTRATION; INTRACAUDAL; PERINEURAL PRN
Status: DISCONTINUED | OUTPATIENT
Start: 2025-05-06 | End: 2025-05-06 | Stop reason: ALTCHOICE

## 2025-05-06 RX ORDER — DEXAMETHASONE SODIUM PHOSPHATE 4 MG/ML
INJECTION, SOLUTION INTRA-ARTICULAR; INTRALESIONAL; INTRAMUSCULAR; INTRAVENOUS; SOFT TISSUE PRN
Status: DISCONTINUED | OUTPATIENT
Start: 2025-05-06 | End: 2025-05-06 | Stop reason: ALTCHOICE

## 2025-05-06 RX ORDER — MIDAZOLAM HYDROCHLORIDE 1 MG/ML
INJECTION, SOLUTION INTRAMUSCULAR; INTRAVENOUS PRN
Status: DISCONTINUED | OUTPATIENT
Start: 2025-05-06 | End: 2025-05-06 | Stop reason: ALTCHOICE

## 2025-05-06 ASSESSMENT — PAIN SCALES - GENERAL: PAINLEVEL_OUTOF10: 3

## 2025-05-06 ASSESSMENT — PAIN - FUNCTIONAL ASSESSMENT
PAIN_FUNCTIONAL_ASSESSMENT: 0-10
PAIN_FUNCTIONAL_ASSESSMENT: 0-10

## 2025-05-06 NOTE — PRE SEDATION
immediately prior to the procedure. (Refer to nursing sedation/analgesia documentation record)    Chico Gil DO  Electronically signed 5/6/2025 at 3:34 PM

## 2025-05-06 NOTE — POST SEDATION
[facility] [place]  Sedation/Analgesia Post Sedation Record    Pt Name: Heather Holman  MRN: [id number]  YOB: 1995  Procedure Performed By: Chico Gil DO  Primary Care Physician: Familia Horner    POST-PROCEDURE    Physicians/Assistants: Chico Gil DO  Procedure Performed: See Procedure Note   Sedation/Anesthesia: Versed and Fentanyl (See procedure note for amount and duration)  Estimated Blood Loss:     0  ml  Specimens Removed: None        Complications: None           Chico Gil DO  Electronically signed 5/6/2025 at 3:34 PM

## 2025-05-06 NOTE — PROCEDURES
Pre-operative Diagnosis: Radicular leg pain     Post-operative Diagnosis: Radicular leg pain     Procedure: Lumbar epidural steroid injection    Procedure Description:  After having obtained a signed informed consent, the patient was taken to the fluoroscopy suite and placed in the prone position. The patient's back was prepped with chloraprep and draped in a sterile fashion.  A total of 1.5 cc of 1 % lidocaine was used to anesthetize the skin and underlying tissues.  Under fluoroscopic guidance, a single 20G Tuohy needle was advanced using midline approach at the L5/S1 interspace until gaining the epidural space using the loss of resistance to saline syringe technique.  There were no paresthesias, heme, or CSF aspiration.  A total of 0.25 cc of Omnipaque 300 were injected having had adequate dye spread within the epidural space. Needle placement and contrast spread was confirmed using the AP and contralateral views.  10 mg of Dexamethasone with 1 cc of saline solution were injected in the epidural space. The needle was flushed and removed without any complication.  The patient tolerated the procedure well and was transported to the recovery room. The patient was observed for 15 minutes to then discharged in an ambulatory fashion.    Procedural Complications: None  Estimated Blood Loss: 0 mL      IV sedation was used during the procedure:  - Moderate intravenous conscious sedation was supervised by Dr. Gil  - The patient was independently monitored by a Registered Nurse assigned to the procedure room  - Monitoring included automated blood pressure, continuous EKG, and continuous pulse oximetry  - The detailed conscious record is permanently stored in the Hospital Information System  - The following is the conscious sedation record:  Start Time: 13:26  End Time : 13:41  Duration: 15 minutes   Medications Administered: 2 mg Versed, 100 mcg Fentanyl       Chico Gil DO  Interventional Pain Management/PM&R

## 2025-05-06 NOTE — PROGRESS NOTES
1331 - Patient arrived to Phase II via bed, report from Wendi GAMBOA. Patient awake and alert without complaints. VSS, site WNL. Denies any numbness or tingling. Drink and snack provided. Call light in reach.    1345 - Patient sat edge of bed, tolerated well. IV removed. Patient dressed.    1351 - Patient discharged via wheelchair to private vehicle with fiance.

## 2025-07-25 ENCOUNTER — OFFICE VISIT (OUTPATIENT)
Dept: PHYSICAL MEDICINE AND REHAB | Age: 30
End: 2025-07-25
Payer: COMMERCIAL

## 2025-07-25 VITALS — DIASTOLIC BLOOD PRESSURE: 74 MMHG | SYSTOLIC BLOOD PRESSURE: 112 MMHG

## 2025-07-25 DIAGNOSIS — G24.3 CERVICAL DYSTONIA: ICD-10-CM

## 2025-07-25 DIAGNOSIS — M47.816 LUMBAR SPONDYLOSIS: Primary | ICD-10-CM

## 2025-07-25 DIAGNOSIS — G89.4 CHRONIC PAIN SYNDROME: ICD-10-CM

## 2025-07-25 DIAGNOSIS — M79.2 NEUROPATHIC PAIN: ICD-10-CM

## 2025-07-25 DIAGNOSIS — M79.18 MYOFASCIAL PAIN: ICD-10-CM

## 2025-07-25 PROCEDURE — G8427 DOCREV CUR MEDS BY ELIG CLIN: HCPCS | Performed by: ANESTHESIOLOGY

## 2025-07-25 PROCEDURE — 4004F PT TOBACCO SCREEN RCVD TLK: CPT | Performed by: ANESTHESIOLOGY

## 2025-07-25 PROCEDURE — 64635 DESTROY LUMB/SAC FACET JNT: CPT | Performed by: ANESTHESIOLOGY

## 2025-07-25 PROCEDURE — 64636 DESTROY L/S FACET JNT ADDL: CPT | Performed by: ANESTHESIOLOGY

## 2025-07-25 PROCEDURE — G8419 CALC BMI OUT NRM PARAM NOF/U: HCPCS | Performed by: ANESTHESIOLOGY

## 2025-07-25 PROCEDURE — 99214 OFFICE O/P EST MOD 30 MIN: CPT | Performed by: ANESTHESIOLOGY

## 2025-07-25 PROCEDURE — 64616 CHEMODENERV MUSC NECK DYSTON: CPT | Performed by: ANESTHESIOLOGY

## 2025-07-25 NOTE — PROGRESS NOTES
Chronic Pain/PM&R Clinic Note     Encounter Date: 7/25/25    Subjective:   Chief Complaint:   Chief Complaint   Patient presents with    Follow-up     Follow up after procedure, botox. Procedure helped for a 'little bit.' Wants to discuss lower back pain, not new but getting worse. Possibly wants another nerve ablation.        History of Present Illness:   Heather Holman is a 29 y.o. female seen in the clinic previously for her neck pain but is now been referred for her low back pain upon request from MD Sriram .  Patient states her low back pain started in December without any inciting event.  She states she has been seeing Dr. Bhatia who was sent her to physical therapy which she started early January at [facility] in [place].  She has noticed some mild improvement since participating in physical therapy but still continues to struggle with the pain in her back.  She states it is hard for her to  1 position, sit for an extended period of time, bend, and twist without having severe pain in her low back.  She states her left side is worse than her right.  She states physical therapy has tried dry needling, cupping, stimulation, and myofascial release.  She has also tried chiropractic care which does seem to help with her hips but not her low back pain.  She continues to do her core exercises at home on a daily basis.  She has been taking ibuprofen which takes the edge off her pain.  Pain does interfere with sleep as it is hard for her to get comfortable as she is a stomach sleeper.  She denies any leg pain at night.  She does get some aching into the back of her thighs, her left being worse than her right.  She denies any falls and does not use an assistive device for ambulation.  She denies any saddle anesthesia or bowel/bladder incontinence. Dr. Bhatia recommended she pursue facet injections and ablation.    Today, 7/25/2025, patient presents for planned follow-up for management of ongoing

## 2025-07-25 NOTE — PROGRESS NOTES
Functionality Assessment/Goals Worksheet     On a scale of 0 (Does not Interfere) to 10 (Completely Interferes)     1.  Which number describes how during the past week pain has interfered with           the following:  A.  General Activity:  6  B.  Mood: 3  C.  Walking Ability:  5  D.  Normal Work (Includes both work outside the home and housework):  5  E.  Relations with Other People:   0  F.  Sleep:   6  G.  Enjoyment of Life:   1    2.  Patient Prefers to Take their Pain Medications:     []  On a regular basis   [x]  Only when necessary    []  Does not take pain medications    3.  What are the Patient's Goals/Expectations for Visiting Pain Management?     []  Learn about my pain    []  Receive Medication   []  Physical Therapy     []  Treat Depression   [x]  Receive Injections    []  Treat Sleep   []  Deal with Anxiety and Stress   []  Treat Opoid Dependence/Addiction   []  Other:

## 2025-07-28 NOTE — PROGRESS NOTES
Pre-operative Diagnosis: Cervical Dystonia     Post-operative Diagnosis: Cervical Dystonia     Procedure: Botox for Cervical Dystonia     Procedure Description:  Patient was seated upright on the examination table. Botox was drawn up into two 3 mL syringes to a concentration of 10 units per 0.1 mL. Skin was prepped with alcohol wipes. The following muscles were injected after negative aspiration; The trapezius muscle bilaterally and cervical paraspinal muscles bilaterally. This was a total of 200 units. The patient tolerated the procedure well.      Amount medication wasted: 0 units        Procedural Complications: None        Chico Gil DO  Interventional Pain Management/PM&R   [facility] [place]

## 2025-08-19 ENCOUNTER — HOSPITAL ENCOUNTER (OUTPATIENT)
Age: 30
Setting detail: OUTPATIENT SURGERY
Discharge: HOME | End: 2025-08-19
Attending: ANESTHESIOLOGY | Admitting: ANESTHESIOLOGY
Payer: COMMERCIAL

## 2025-08-19 ENCOUNTER — APPOINTMENT (OUTPATIENT)
Dept: GENERAL RADIOLOGY | Age: 30
End: 2025-08-19
Attending: ANESTHESIOLOGY
Payer: COMMERCIAL

## 2025-08-19 VITALS
HEART RATE: 87 BPM | RESPIRATION RATE: 16 BRPM | WEIGHT: 139.8 LBS | BODY MASS INDEX: 26.39 KG/M2 | OXYGEN SATURATION: 97 % | TEMPERATURE: 98.3 F | HEIGHT: 61 IN | DIASTOLIC BLOOD PRESSURE: 73 MMHG | SYSTOLIC BLOOD PRESSURE: 121 MMHG

## 2025-08-19 PROBLEM — M47.816 LUMBAR SPONDYLOSIS: Status: ACTIVE | Noted: 2025-08-19

## 2025-08-19 PROCEDURE — 6360000002 HC RX W HCPCS: Performed by: ANESTHESIOLOGY

## 2025-08-19 PROCEDURE — 3600000057 HC PAIN LEVEL 4 ADDL 15 MIN: Performed by: ANESTHESIOLOGY

## 2025-08-19 PROCEDURE — 2709999900 HC NON-CHARGEABLE SUPPLY: Performed by: ANESTHESIOLOGY

## 2025-08-19 PROCEDURE — 3600000056 HC PAIN LEVEL 4 BASE: Performed by: ANESTHESIOLOGY

## 2025-08-19 PROCEDURE — 99152 MOD SED SAME PHYS/QHP 5/>YRS: CPT | Performed by: ANESTHESIOLOGY

## 2025-08-19 PROCEDURE — 7100000010 HC PHASE II RECOVERY - FIRST 15 MIN: Performed by: ANESTHESIOLOGY

## 2025-08-19 PROCEDURE — 7100000011 HC PHASE II RECOVERY - ADDTL 15 MIN: Performed by: ANESTHESIOLOGY

## 2025-08-19 PROCEDURE — 64636 DESTROY L/S FACET JNT ADDL: CPT | Performed by: ANESTHESIOLOGY

## 2025-08-19 PROCEDURE — 64635 DESTROY LUMB/SAC FACET JNT: CPT | Performed by: ANESTHESIOLOGY

## 2025-08-19 RX ORDER — LIDOCAINE HYDROCHLORIDE 20 MG/ML
INJECTION, SOLUTION EPIDURAL; INFILTRATION; INTRACAUDAL; PERINEURAL PRN
Status: DISCONTINUED | OUTPATIENT
Start: 2025-08-19 | End: 2025-08-19 | Stop reason: ALTCHOICE

## 2025-08-19 RX ORDER — LIDOCAINE HYDROCHLORIDE 10 MG/ML
INJECTION, SOLUTION EPIDURAL; INFILTRATION; INTRACAUDAL; PERINEURAL PRN
Status: DISCONTINUED | OUTPATIENT
Start: 2025-08-19 | End: 2025-08-19 | Stop reason: ALTCHOICE

## 2025-08-19 RX ORDER — MIDAZOLAM HYDROCHLORIDE 1 MG/ML
INJECTION, SOLUTION INTRAMUSCULAR; INTRAVENOUS PRN
Status: DISCONTINUED | OUTPATIENT
Start: 2025-08-19 | End: 2025-08-19 | Stop reason: ALTCHOICE

## 2025-08-19 RX ORDER — FENTANYL CITRATE 50 UG/ML
INJECTION, SOLUTION INTRAMUSCULAR; INTRAVENOUS PRN
Status: DISCONTINUED | OUTPATIENT
Start: 2025-08-19 | End: 2025-08-19 | Stop reason: ALTCHOICE

## 2025-08-19 ASSESSMENT — PAIN SCALES - GENERAL: PAINLEVEL_OUTOF10: 4

## 2025-08-19 ASSESSMENT — PAIN DESCRIPTION - DESCRIPTORS: DESCRIPTORS: ACHING

## 2025-08-19 ASSESSMENT — PAIN - FUNCTIONAL ASSESSMENT
PAIN_FUNCTIONAL_ASSESSMENT: 0-10
PAIN_FUNCTIONAL_ASSESSMENT: 0-10

## (undated) DEVICE — 6 ML SYRINGE LUER-LOCK TIP: Brand: MONOJECT

## (undated) DEVICE — Device

## (undated) DEVICE — NEEDLE SPNL 22GA L3.5IN BLK HUB S STL REG WALL FIT STYL

## (undated) DEVICE — NEEDLE HYPO 18GA L1.5IN THN WALL PIVOTING SHLD BVL ORIENTED

## (undated) DEVICE — SYRINGE MEDICAL 3ML CLEAR PLASTIC STANDARD NON CONTROL LUERLOCK TIP DISPOSABLE

## (undated) DEVICE — HYPODERMIC SAFETY NEEDLE: Brand: MAGELLAN

## (undated) DEVICE — APPLICATOR MEDICATED 3 CC SOLUTION CLR STRL CHLORAPREP 930400

## (undated) DEVICE — SYRINGE MED 3ML CLR PLAS STD N CTRL LUERLOCK TIP DISP

## (undated) DEVICE — GLOVE ORANGE PI 8 1/2   MSG9085

## (undated) DEVICE — GAUZE SPONGES,USP TYPE VII GAUZE, 12 PLY: Brand: CURITY

## (undated) DEVICE — NEEDLE EPI L3.5IN OD20GA CLR POLYCARB HUB WNG N DEHP TUOHY

## (undated) DEVICE — SC PAIN PACK: Brand: MEDLINE INDUSTRIES, INC.

## (undated) DEVICE — GLOVE BIOGEL POWDER FREE SZ 8

## (undated) DEVICE — 1840 FOAM BLOCK NEEDLE COUNTER: Brand: DEVON

## (undated) DEVICE — TOWEL,OR,DSP,ST,BLUE,STD,4/PK,20PK/CS: Brand: MEDLINE

## (undated) DEVICE — SYRINGE MED 10ML LUERLOCK TIP W/O SFTY DISP

## (undated) DEVICE — 3 ML SYRINGE LUER-LOCK TIP: Brand: MONOJECT

## (undated) DEVICE — APPLICATOR MEDICATED 26 CC SOLUTION HI LT ORNG CHLORAPREP

## (undated) DEVICE — MARKER,SKIN,WI/RULER AND LABELS: Brand: MEDLINE

## (undated) DEVICE — NEEDLE HYPO 25GA L1.5IN ROBUST SFTY SHLD SELF LEVELING SHTH

## (undated) DEVICE — NEEDLE SPNL 22GA L3.5IN BLK HUB S STL REG WALL FIT STYL W/

## (undated) DEVICE — SYRINGE MED 7ML PLAS LUERSLIP LOSS OF RESISTANCE EPILOR